# Patient Record
Sex: MALE | Race: WHITE | NOT HISPANIC OR LATINO | Employment: FULL TIME | URBAN - METROPOLITAN AREA
[De-identification: names, ages, dates, MRNs, and addresses within clinical notes are randomized per-mention and may not be internally consistent; named-entity substitution may affect disease eponyms.]

---

## 2019-03-12 ENCOUNTER — OFFICE VISIT (OUTPATIENT)
Dept: FAMILY MEDICINE CLINIC | Facility: CLINIC | Age: 19
End: 2019-03-12
Payer: COMMERCIAL

## 2019-03-12 VITALS
TEMPERATURE: 99.1 F | BODY MASS INDEX: 37.62 KG/M2 | HEIGHT: 68 IN | HEART RATE: 94 BPM | OXYGEN SATURATION: 98 % | DIASTOLIC BLOOD PRESSURE: 68 MMHG | SYSTOLIC BLOOD PRESSURE: 126 MMHG | WEIGHT: 248.2 LBS

## 2019-03-12 DIAGNOSIS — Z88.9 H/O SEASONAL ALLERGIES: ICD-10-CM

## 2019-03-12 DIAGNOSIS — J45.20 MILD INTERMITTENT REACTIVE AIRWAY DISEASE WITHOUT COMPLICATION: ICD-10-CM

## 2019-03-12 DIAGNOSIS — Z00.00 HEALTH CARE MAINTENANCE: Primary | ICD-10-CM

## 2019-03-12 PROCEDURE — 99385 PREV VISIT NEW AGE 18-39: CPT | Performed by: FAMILY MEDICINE

## 2019-03-12 RX ORDER — CETIRIZINE HYDROCHLORIDE 10 MG/1
10 TABLET ORAL DAILY
COMMUNITY

## 2019-03-12 RX ORDER — ALBUTEROL SULFATE 90 UG/1
2 AEROSOL, METERED RESPIRATORY (INHALATION) EVERY 6 HOURS PRN
Qty: 1 INHALER | Refills: 3 | Status: SHIPPED | OUTPATIENT
Start: 2019-03-12 | End: 2022-02-25 | Stop reason: SDUPTHER

## 2019-03-12 NOTE — PROGRESS NOTES
Assessment/Plan: new pt here today for yearly PE     No problem-specific Assessment & Plan notes found for this encounter  Diagnoses and all orders for this visit:    Health care maintenance    Mild intermittent reactive airway disease without complication  -     albuterol (PROAIR HFA) 90 mcg/act inhaler; Inhale 2 puffs every 6 (six) hours as needed for wheezing    H/O seasonal allergies    Other orders  -     cetirizine (ZyrTEC) 10 mg tablet; Take 10 mg by mouth daily  -     Multiple Vitamin (ONE-A-DAY MENS PO); Take by mouth  -     Discontinue: Albuterol Sulfate (PROAIR RESPICLICK) 509 (90 Base) MCG/ACT AEPB; Inhale          Subjective:      Patient ID: Jennifer Penaloza is a 19 y o  male  First visit  College student, 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, 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[employer])  PSH: Negative  PMH: Negative except for allergies, intermittent RAD  FH: Father with heart disease  Mother: HTN  CM: Zyrtec and Proair HFA if needed      The following portions of the patient's history were reviewed and updated as appropriate: allergies, current medications, past family history, past medical history, past social history, past surgical history and problem list     Current Outpatient Medications:     cetirizine (ZyrTEC) 10 mg tablet, Take 10 mg by mouth daily, Disp: , Rfl:     Multiple Vitamin (ONE-A-DAY MENS PO), Take by mouth, Disp: , Rfl:     albuterol (PROAIR HFA) 90 mcg/act inhaler, Inhale 2 puffs every 6 (six) hours as needed for wheezing, Disp: 1 Inhaler, Rfl: 3     Allergies   Allergen Reactions    Penicillins Rash       Review of Systems   Constitutional: Negative  HENT: Negative  Eyes: Negative  Respiratory: Negative  Cardiovascular: Negative  Gastrointestinal: Negative  Genitourinary: Negative  Musculoskeletal: Negative  Skin: Negative  Neurological: Negative  Psychiatric/Behavioral: Negative            Objective:      /68 (BP Location: Left arm, Patient Position: Sitting, Cuff Size: Large)   Pulse 94   Temp 99 1 °F (37 3 °C) (Oral)   Ht 5' 8 11" (1 73 m)   Wt 113 kg (248 lb 3 2 oz)   SpO2 98%   BMI 37 62 kg/m²          Physical Exam   Constitutional: He is oriented to person, place, and time  He appears well-developed and well-nourished  HENT:   Head: Normocephalic and atraumatic  Right Ear: External ear normal    Left Ear: External ear normal    Nose: Nose normal    Mouth/Throat: Oropharynx is clear and moist    Eyes: Pupils are equal, round, and reactive to light  Conjunctivae and EOM are normal    Neck: Normal range of motion  Neck supple  Cardiovascular: Normal rate, regular rhythm, normal heart sounds and intact distal pulses  Pulmonary/Chest: Effort normal and breath sounds normal    Abdominal: Soft  Bowel sounds are normal    Neurological: He is alert and oriented to person, place, and time  He has normal reflexes  Skin: Skin is warm and dry  Psychiatric: He has a normal mood and affect   His behavior is normal  Judgment and thought content normal

## 2020-03-10 ENCOUNTER — OFFICE VISIT (OUTPATIENT)
Dept: FAMILY MEDICINE CLINIC | Facility: CLINIC | Age: 20
End: 2020-03-10
Payer: COMMERCIAL

## 2020-03-10 VITALS
HEIGHT: 68 IN | OXYGEN SATURATION: 98 % | DIASTOLIC BLOOD PRESSURE: 75 MMHG | BODY MASS INDEX: 39.77 KG/M2 | HEART RATE: 97 BPM | TEMPERATURE: 98.7 F | WEIGHT: 262.4 LBS | SYSTOLIC BLOOD PRESSURE: 130 MMHG

## 2020-03-10 DIAGNOSIS — Z82.49 FAMILY HISTORY OF CARDIOMYOPATHY: ICD-10-CM

## 2020-03-10 DIAGNOSIS — E55.9 VITAMIN D DEFICIENCY: ICD-10-CM

## 2020-03-10 DIAGNOSIS — Z00.00 HEALTH CARE MAINTENANCE: Primary | ICD-10-CM

## 2020-03-10 DIAGNOSIS — R06.83 SNORING: ICD-10-CM

## 2020-03-10 DIAGNOSIS — E78.00 HYPERCHOLESTEREMIA: ICD-10-CM

## 2020-03-10 PROCEDURE — 3008F BODY MASS INDEX DOCD: CPT | Performed by: FAMILY MEDICINE

## 2020-03-10 PROCEDURE — 93000 ELECTROCARDIOGRAM COMPLETE: CPT | Performed by: FAMILY MEDICINE

## 2020-03-10 PROCEDURE — 99395 PREV VISIT EST AGE 18-39: CPT | Performed by: FAMILY MEDICINE

## 2020-03-10 NOTE — PROGRESS NOTES
Chief Complaint   Patient presents with    Physical Exam    Well Check     Assessment/Plan:  With fathers history, Lipids, EKG and ECHO suggested  See ENT this summer for snoring  BMI Counseling: Body mass index is 39 77 kg/m²  The BMI is above normal  Nutrition recommendations include moderation in carbohydrate intake and increasing intake of lean protein  Diagnoses and all orders for this visit:    Health care maintenance    Family history of cardiomyopathy  -     POCT ECG  -     Echo complete with contrast if indicated; Future    Hypercholesteremia  -     Basic metabolic panel; Future  -     CBC and Platelet; Future  -     Hepatic function panel; Future  -     Lipid panel; Future    Vitamin D deficiency  -     Vitamin D 25 hydroxy; Future    BMI 39 0-39 9,adult    Snoring          Subjective:      Patient ID: Belen Haro is a 22 y o  male  [facility]  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, computer crimes  Home on spring break  FH: Father diagnosed with idiopathic cardiomyopathy at age 45-Gene testing negative- and hyperlipidemia  Snoring, no fatigue  The following portions of the patient's history were reviewed and updated as appropriate: allergies, current medications, past family history, past medical history, past social history, past surgical history and problem list     Review of Systems   Constitutional: Negative  HENT: Negative  Eyes: Negative  Respiratory: Negative  Cardiovascular: Negative  Gastrointestinal: Negative  Genitourinary: Negative  Musculoskeletal: Negative  Skin: Negative  Neurological: Negative  Psychiatric/Behavioral: Negative  Objective:      /88 (BP Location: Left arm, Patient Position: Sitting, Cuff Size: Large)   Pulse 97   Temp 98 7 °F (37 1 °C) (Oral)   Ht 5' 8 11" (1 73 m)   Wt 119 kg (262 lb 6 4 oz)   SpO2 98%   BMI 39 77 kg/m²          Physical Exam   Constitutional: He is oriented to person, place, and time   He appears well-developed and well-nourished  HENT:   Head: Normocephalic and atraumatic  Right Ear: External ear normal    Left Ear: External ear normal    Nose: Nose normal    Mouth/Throat: Oropharynx is clear and moist    Eyes: Pupils are equal, round, and reactive to light  Conjunctivae and EOM are normal    Neck: Normal range of motion  Neck supple  Cardiovascular: Normal rate, regular rhythm, normal heart sounds and intact distal pulses  Pulmonary/Chest: Effort normal and breath sounds normal    Abdominal: Soft  Bowel sounds are normal    Neurological: He is alert and oriented to person, place, and time  He has normal reflexes  Skin: Skin is warm and dry  Psychiatric: He has a normal mood and affect   His behavior is normal  Judgment and thought content normal

## 2021-03-30 DIAGNOSIS — Z23 ENCOUNTER FOR IMMUNIZATION: ICD-10-CM

## 2021-04-01 ENCOUNTER — IMMUNIZATIONS (OUTPATIENT)
Dept: FAMILY MEDICINE CLINIC | Facility: HOSPITAL | Age: 21
End: 2021-04-01

## 2021-04-01 DIAGNOSIS — Z23 ENCOUNTER FOR IMMUNIZATION: Primary | ICD-10-CM

## 2021-04-01 PROCEDURE — 91300 SARS-COV-2 / COVID-19 MRNA VACCINE (PFIZER-BIONTECH) 30 MCG: CPT

## 2021-04-01 PROCEDURE — 0001A SARS-COV-2 / COVID-19 MRNA VACCINE (PFIZER-BIONTECH) 30 MCG: CPT

## 2021-04-22 ENCOUNTER — IMMUNIZATIONS (OUTPATIENT)
Dept: FAMILY MEDICINE CLINIC | Facility: HOSPITAL | Age: 21
End: 2021-04-22

## 2021-04-22 DIAGNOSIS — Z23 ENCOUNTER FOR IMMUNIZATION: Primary | ICD-10-CM

## 2021-04-22 PROCEDURE — 0002A SARS-COV-2 / COVID-19 MRNA VACCINE (PFIZER-BIONTECH) 30 MCG: CPT

## 2021-04-22 PROCEDURE — 91300 SARS-COV-2 / COVID-19 MRNA VACCINE (PFIZER-BIONTECH) 30 MCG: CPT

## 2022-02-24 ENCOUNTER — OFFICE VISIT (OUTPATIENT)
Dept: URGENT CARE | Age: 22
End: 2022-02-24
Payer: COMMERCIAL

## 2022-02-24 VITALS
HEIGHT: 68 IN | HEART RATE: 99 BPM | TEMPERATURE: 98.1 F | SYSTOLIC BLOOD PRESSURE: 137 MMHG | OXYGEN SATURATION: 100 % | DIASTOLIC BLOOD PRESSURE: 93 MMHG | WEIGHT: 250 LBS | BODY MASS INDEX: 37.89 KG/M2 | RESPIRATION RATE: 18 BRPM

## 2022-02-24 DIAGNOSIS — R10.84 GENERALIZED ABDOMINAL PAIN: Primary | ICD-10-CM

## 2022-02-24 PROCEDURE — S9083 URGENT CARE CENTER GLOBAL: HCPCS | Performed by: STUDENT IN AN ORGANIZED HEALTH CARE EDUCATION/TRAINING PROGRAM

## 2022-02-24 PROCEDURE — G0382 LEV 3 HOSP TYPE B ED VISIT: HCPCS | Performed by: STUDENT IN AN ORGANIZED HEALTH CARE EDUCATION/TRAINING PROGRAM

## 2022-02-24 NOTE — PROGRESS NOTES
[facility]        NAME: Shahana Jerez is a 19 y o  male  : 2000    MRN: [id number]  DATE: 2022  TIME: 9:51 AM    Assessment and Plan   Generalized abdominal pain [R10 84]  1  Generalized abdominal pain       May use 1% hydrocortisone cream over-the-counter, discussed increasing water intake as well as fiber intake, proceed to ER if worsening symptoms  Patient Instructions       Follow up with PCP in 3-5 days  Proceed to  ER if symptoms worsen  Chief Complaint     Chief Complaint   Patient presents with    Abdominal Pain    Rectal Bleeding         History of Present Illness       HPI   Patient presents complaining of generalized abdominal pain for the past few days  He is also described virtual with  Patient denies any change in bowel or bladder apart from this  Denies any fevers or chills  Patient states that he has been having large bowel movements for the past few days as well  Review of Systems   Review of Systems  Per hpi     Current Medications       Current Outpatient Medications:     albuterol (PROAIR HFA) 90 mcg/act inhaler, Inhale 2 puffs every 6 (six) hours as needed for wheezing, Disp: 1 Inhaler, Rfl: 3    cetirizine (ZyrTEC) 10 mg tablet, Take 10 mg by mouth daily, Disp: , Rfl:     Multiple Vitamin (ONE-A-DAY MENS PO), Take by mouth, Disp: , Rfl:     Current Allergies     Allergies as of 2022 - Reviewed 2022   Allergen Reaction Noted    Penicillins Rash 2019            The following portions of the patient's history were reviewed and updated as appropriate: allergies, current medications, past family history, past medical history, past social history, past surgical history and problem list      No past medical history on file  No past surgical history on file  Family History   Problem Relation Age of Onset    Cardiomyopathy Father          Medications have been verified          Objective   /93   Pulse 99   Temp 98 1 °F (36 7 °C)   Resp 18   Ht 5' 8" (1 727 m)   Wt 113 kg (250 lb)   SpO2 100%   BMI 38 01 kg/m²   No LMP for male patient  Physical Exam     Physical Exam  Constitutional:       General: He is not in acute distress  Appearance: He is well-developed  He is not diaphoretic  HENT:      Head: Normocephalic and atraumatic  Right Ear: Tympanic membrane and external ear normal       Left Ear: Tympanic membrane and external ear normal       Mouth/Throat:      Mouth: Mucous membranes are moist       Pharynx: Oropharynx is clear  No oropharyngeal exudate  Eyes:      Extraocular Movements: Extraocular movements intact  Conjunctiva/sclera: Conjunctivae normal    Cardiovascular:      Rate and Rhythm: Normal rate and regular rhythm  Heart sounds: Normal heart sounds  Pulmonary:      Effort: Pulmonary effort is normal  No respiratory distress  Breath sounds: Normal breath sounds  No wheezing  Abdominal:      General: Bowel sounds are normal  There is no distension  Palpations: Abdomen is soft  There is no mass  Tenderness: There is no abdominal tenderness  Genitourinary:     Comments: Anal fissure noted at the 9 o'clock position no active bleeding  Musculoskeletal:         General: No swelling or tenderness  Cervical back: Normal range of motion  Right lower leg: No edema  Left lower leg: No edema  Lymphadenopathy:      Cervical: No cervical adenopathy  Skin:     General: Skin is warm  Neurological:      Mental Status: He is alert and oriented to person, place, and time

## 2022-02-25 DIAGNOSIS — J45.20 MILD INTERMITTENT REACTIVE AIRWAY DISEASE WITHOUT COMPLICATION: ICD-10-CM

## 2022-02-25 RX ORDER — ALBUTEROL SULFATE 90 UG/1
2 AEROSOL, METERED RESPIRATORY (INHALATION) EVERY 6 HOURS PRN
Qty: 18 G | Refills: 0 | Status: SHIPPED | OUTPATIENT
Start: 2022-02-25

## 2022-02-25 RX ORDER — ALBUTEROL SULFATE 90 UG/1
2 AEROSOL, METERED RESPIRATORY (INHALATION) EVERY 6 HOURS PRN
OUTPATIENT
Start: 2022-02-25

## 2022-02-28 ENCOUNTER — OFFICE VISIT (OUTPATIENT)
Dept: FAMILY MEDICINE CLINIC | Facility: CLINIC | Age: 22
End: 2022-02-28
Payer: COMMERCIAL

## 2022-02-28 VITALS
WEIGHT: 243 LBS | HEART RATE: 112 BPM | HEIGHT: 68 IN | TEMPERATURE: 97.8 F | OXYGEN SATURATION: 98 % | DIASTOLIC BLOOD PRESSURE: 90 MMHG | SYSTOLIC BLOOD PRESSURE: 110 MMHG | BODY MASS INDEX: 36.83 KG/M2

## 2022-02-28 DIAGNOSIS — R10.13 EPIGASTRIC DISCOMFORT: ICD-10-CM

## 2022-02-28 DIAGNOSIS — Z82.49 FAMILY HISTORY OF DILATED CARDIOMYOPATHY: ICD-10-CM

## 2022-02-28 DIAGNOSIS — R06.2 WHEEZING: ICD-10-CM

## 2022-02-28 DIAGNOSIS — R14.2 BELCHING: ICD-10-CM

## 2022-02-28 DIAGNOSIS — R03.0 BORDERLINE HYPERTENSION: ICD-10-CM

## 2022-02-28 DIAGNOSIS — J45.20 MILD INTERMITTENT REACTIVE AIRWAY DISEASE WITHOUT COMPLICATION: ICD-10-CM

## 2022-02-28 DIAGNOSIS — J40 BRONCHITIS: Primary | ICD-10-CM

## 2022-02-28 PROCEDURE — 99214 OFFICE O/P EST MOD 30 MIN: CPT | Performed by: FAMILY MEDICINE

## 2022-02-28 RX ORDER — AZITHROMYCIN 250 MG/1
TABLET, FILM COATED ORAL
Qty: 6 TABLET | Refills: 0 | Status: SHIPPED | OUTPATIENT
Start: 2022-02-28 | End: 2022-03-05

## 2022-02-28 RX ORDER — METHYLPREDNISOLONE 4 MG/1
TABLET ORAL
Qty: 21 EACH | Refills: 0 | Status: SHIPPED | OUTPATIENT
Start: 2022-02-28

## 2022-02-28 RX ORDER — DOXYCYCLINE HYCLATE 100 MG
100 TABLET ORAL 2 TIMES DAILY
Qty: 14 TABLET | Refills: 0 | Status: SHIPPED | OUTPATIENT
Start: 2022-02-28 | End: 2022-02-28 | Stop reason: CLARIF

## 2022-02-28 RX ORDER — FAMOTIDINE 40 MG/1
40 TABLET, FILM COATED ORAL DAILY
Qty: 30 TABLET | Refills: 2 | Status: SHIPPED | OUTPATIENT
Start: 2022-02-28

## 2022-02-28 NOTE — PROGRESS NOTES
Chief Complaint   Patient presents with    Chest Pain     3 days    Abdominal Pain     Assessment/Plan:  Go for the ECHO with fathers history  No caffeine or choclate      BMI Counseling: Body mass index is 36 95 kg/m²  The BMI is above normal  Nutrition recommendations include reducing portion sizes, consuming healthier snacks, moderation in carbohydrate intake and increasing intake of lean protein  PHQ-2/9 Depression Screening    Little interest or pleasure in doing things: 0 - not at all  Feeling down, depressed, or hopeless: 0 - not at all  PHQ-2 Score: 0  PHQ-2 Interpretation: Negative depression screen          Diagnoses and all orders for this visit:    Bronchitis  -     Discontinue: doxycycline hyclate (VIBRA-TABS) 100 mg tablet; Take 1 tablet (100 mg total) by mouth 2 (two) times a day for 7 days  -     azithromycin (Zithromax) 250 mg tablet; Take 2 tablets (500 mg total) by mouth daily for 1 day, THEN 1 tablet (250 mg total) daily for 4 days  Wheezing  -     Discontinue: mometasone-formoterol (Dulera) 100-5 MCG/ACT inhaler; Inhale 2 puffs 2 (two) times a day Rinse mouth after use  -     methylPREDNISolone 4 MG tablet therapy pack; Use as directed on package    Mild intermittent reactive airway disease without complication    Family history of dilated cardiomyopathy  -     Echo complete w/ contrast if indicated; Future    Borderline hypertension  -     Echo complete w/ contrast if indicated; Future    Belching  -     famotidine (PEPCID) 40 MG tablet; Take 1 tablet (40 mg total) by mouth daily    Epigastric discomfort  -     famotidine (PEPCID) 40 MG tablet; Take 1 tablet (40 mg total) by mouth daily          Subjective:      Patient ID: Jennifer Penaloza is a 19 y o  male  Head and chest cold, coughing, little wheezing  Inhaler not working  Started 3 days ago  Hurt chest from coughing  SH: Working in lab cutting lenses    Epigastric discomfort past with belching and burping past 2 - 3 weeks       The following portions of the patient's history were reviewed and updated as appropriate: allergies, current medications, past family history, past medical history, past social history, past surgical history and problem list     Review of Systems   Constitutional: Negative  HENT: Negative  Eyes: Negative  Respiratory: Positive for cough, chest tightness and wheezing  Cardiovascular: Negative  Gastrointestinal: Negative  Genitourinary: Negative  Musculoskeletal: Negative  Skin: Negative  Neurological: Negative  Psychiatric/Behavioral: Negative  Objective:      /90 (BP Location: Left arm, Patient Position: Sitting, Cuff Size: Large)   Pulse (!) 112   Temp 97 8 °F (36 6 °C) (Temporal)   Ht 5' 8" (1 727 m)   Wt 110 kg (243 lb)   SpO2 98%   BMI 36 95 kg/m²     Current Outpatient Medications:     albuterol (ProAir HFA) 90 mcg/act inhaler, Inhale 2 puffs every 6 (six) hours as needed for wheezing, Disp: 18 g, Rfl: 0    azithromycin (Zithromax) 250 mg tablet, Take 2 tablets (500 mg total) by mouth daily for 1 day, THEN 1 tablet (250 mg total) daily for 4 days  , Disp: 6 tablet, Rfl: 0    cetirizine (ZyrTEC) 10 mg tablet, Take 10 mg by mouth daily (Patient not taking: Reported on 2/28/2022 ), Disp: , Rfl:     Dulera 100-5 MCG/ACT inhaler, INHALE 2 PUFFS BY MOUTH 2 TIMES A DAY RINSE MOUTH AFTER USE , Disp: 13 g, Rfl: 1    famotidine (PEPCID) 40 MG tablet, Take 1 tablet (40 mg total) by mouth daily, Disp: 30 tablet, Rfl: 2    methylPREDNISolone 4 MG tablet therapy pack, Use as directed on package, Disp: 21 each, Rfl: 0    Multiple Vitamin (ONE-A-DAY MENS PO), Take by mouth (Patient not taking: Reported on 2/28/2022 ), Disp: , Rfl:   Allergies   Allergen Reactions    Penicillins Rash     History reviewed  No pertinent surgical history  Physical Exam  Constitutional:       Appearance: He is well-developed     HENT:      Head: Normocephalic and atraumatic  Right Ear: External ear normal       Left Ear: External ear normal       Nose: Nose normal    Eyes:      Conjunctiva/sclera: Conjunctivae normal       Pupils: Pupils are equal, round, and reactive to light  Cardiovascular:      Rate and Rhythm: Normal rate and regular rhythm  Heart sounds: Normal heart sounds  Pulmonary:      Effort: Pulmonary effort is normal       Breath sounds: Normal breath sounds  Abdominal:      General: Abdomen is flat  Bowel sounds are normal       Palpations: Abdomen is soft  Comments: Mild epigastric discomfort to palpation  Musculoskeletal:      Cervical back: Normal range of motion and neck supple  Skin:     General: Skin is warm and dry  Neurological:      Mental Status: He is alert and oriented to person, place, and time  Deep Tendon Reflexes: Reflexes are normal and symmetric  Psychiatric:         Behavior: Behavior normal          Thought Content:  Thought content normal          Judgment: Judgment normal

## 2022-03-09 ENCOUNTER — TELEPHONE (OUTPATIENT)
Dept: FAMILY MEDICINE CLINIC | Facility: CLINIC | Age: 22
End: 2022-03-09

## 2022-03-09 NOTE — TELEPHONE ENCOUNTER
Pt called stated that he is having stomach aches, feeling very fatigue and having dizziness he will like to know if this is a side effect to medication

## 2022-03-09 NOTE — TELEPHONE ENCOUNTER
Called pt he stated that he is having left sided abdominal since Sunday and center chest pain with diarrhea and heart burn at times

## 2022-03-11 NOTE — TELEPHONE ENCOUNTER
Called pt LM asking how he is feeling and told to do BRAT diet, advised pt to call back if he is not feeling better for further guidance

## 2022-03-16 ENCOUNTER — RA CDI HCC (OUTPATIENT)
Dept: OTHER | Facility: HOSPITAL | Age: 22
End: 2022-03-16

## 2022-03-16 NOTE — PROGRESS NOTES
[facility]  coding opportunities       Chart reviewed, no opportunity found: CHART REVIEWED, NO OPPORTUNITY FOUND        Patients Insurance        Commercial Insurance: [employer]

## 2022-03-23 ENCOUNTER — OFFICE VISIT (OUTPATIENT)
Dept: FAMILY MEDICINE CLINIC | Facility: CLINIC | Age: 22
End: 2022-03-23
Payer: COMMERCIAL

## 2022-03-23 VITALS
WEIGHT: 237 LBS | BODY MASS INDEX: 35.92 KG/M2 | TEMPERATURE: 97.8 F | HEART RATE: 105 BPM | OXYGEN SATURATION: 98 % | DIASTOLIC BLOOD PRESSURE: 90 MMHG | SYSTOLIC BLOOD PRESSURE: 130 MMHG | HEIGHT: 68 IN

## 2022-03-23 DIAGNOSIS — K21.9 GASTROESOPHAGEAL REFLUX DISEASE, UNSPECIFIED WHETHER ESOPHAGITIS PRESENT: ICD-10-CM

## 2022-03-23 DIAGNOSIS — J45.20 MILD INTERMITTENT REACTIVE AIRWAY DISEASE WITHOUT COMPLICATION: ICD-10-CM

## 2022-03-23 DIAGNOSIS — Z00.00 HEALTH CARE MAINTENANCE: Primary | ICD-10-CM

## 2022-03-23 PROCEDURE — 3725F SCREEN DEPRESSION PERFORMED: CPT | Performed by: FAMILY MEDICINE

## 2022-03-23 PROCEDURE — 3008F BODY MASS INDEX DOCD: CPT | Performed by: FAMILY MEDICINE

## 2022-03-23 PROCEDURE — 99395 PREV VISIT EST AGE 18-39: CPT | Performed by: FAMILY MEDICINE

## 2022-03-23 PROCEDURE — 1036F TOBACCO NON-USER: CPT | Performed by: FAMILY MEDICINE

## 2022-03-23 RX ORDER — BUDESONIDE, GLYCOPYRROLATE, AND FORMOTEROL FUMARATE 160; 9; 4.8 UG/1; UG/1; UG/1
2 AEROSOL, METERED RESPIRATORY (INHALATION) 2 TIMES DAILY PRN
Qty: 10.7 G | Refills: 5 | Status: SHIPPED | OUTPATIENT
Start: 2022-03-23

## 2022-03-23 RX ORDER — PANTOPRAZOLE SODIUM 40 MG/1
40 TABLET, DELAYED RELEASE ORAL DAILY
Qty: 90 TABLET | Refills: 1 | Status: SHIPPED | OUTPATIENT
Start: 2022-03-23

## 2022-03-23 NOTE — PROGRESS NOTES
Chief Complaint   Patient presents with    Physical Exam     3 wk ck    Chest Pain     x 10 days     Assessment/Plan:  Will Rx Breztri - gave card for zero co-pay  Hold Pepcid, start Protonix 40 mg  Discussed reflux and RAD possible relationship  BMI Counseling: Body mass index is 36 04 kg/m²  The BMI is above normal  Nutrition recommendations include consuming healthier snacks, moderation in carbohydrate intake and increasing intake of lean protein  PHQ-2/9 Depression Screening    Little interest or pleasure in doing things: 0 - not at all  Feeling down, depressed, or hopeless: 0 - not at all  PHQ-2 Score: 0  PHQ-2 Interpretation: Negative depression screen          Diagnoses and all orders for this visit:    Health care maintenance    Mild intermittent reactive airway disease without complication  -     Budeson-Glycopyrrol-Formoterol (Breztri Aerosphere) 160-9-4 8 MCG/ACT AERO; Inhale 2 puffs 2 (two) times a day as needed (as needed) Rinse mouth after use  Gastroesophageal reflux disease, unspecified whether esophagitis present  -     pantoprazole (PROTONIX) 40 mg tablet; Take 1 tablet (40 mg total) by mouth daily          Subjective:      Patient ID: Justo Armenta is a 19 y o  male  Scheduled for the ECHO end of this month  Can get chest pain left side - touching makes it hurt - gets sitting  Ins needs a prior authorization for [facility]  Using albuterol 5 days a week  Continues to gfet indigestion with the famotidine 40 mg  Likes to eat spices  The following portions of the patient's history were reviewed and updated as appropriate: allergies, current medications, past family history, past medical history, past social history, past surgical history and problem list     Review of Systems   Constitutional: Negative  HENT: Negative  Eyes: Negative  Respiratory: Negative  Cardiovascular: Negative  Gastrointestinal: Negative  Genitourinary: Negative  Musculoskeletal: Negative  Skin: Negative  Neurological: Negative  Psychiatric/Behavioral: Negative  Objective:      /90 (BP Location: Left arm, Patient Position: Sitting, Cuff Size: Large)   Pulse 105   Temp 97 8 °F (36 6 °C) (Temporal)   Ht 5' 8" (1 727 m)   Wt 108 kg (237 lb)   SpO2 98%   BMI 36 04 kg/m²     Current Outpatient Medications:     albuterol (ProAir HFA) 90 mcg/act inhaler, Inhale 2 puffs every 6 (six) hours as needed for wheezing, Disp: 18 g, Rfl: 0    famotidine (PEPCID) 40 MG tablet, Take 1 tablet (40 mg total) by mouth daily, Disp: 30 tablet, Rfl: 2    cetirizine (ZyrTEC) 10 mg tablet, Take 10 mg by mouth daily (Patient not taking: Reported on 2/28/2022 ), Disp: , Rfl:     Dulera 100-5 MCG/ACT inhaler, INHALE 2 PUFFS BY MOUTH 2 TIMES A DAY RINSE MOUTH AFTER USE  (Patient not taking: Reported on 3/23/2022), Disp: 13 g, Rfl: 1    methylPREDNISolone 4 MG tablet therapy pack, Use as directed on package (Patient not taking: Reported on 3/23/2022 ), Disp: 21 each, Rfl: 0    Multiple Vitamin (ONE-A-DAY MENS PO), Take by mouth (Patient not taking: Reported on 2/28/2022 ), Disp: , Rfl:   Allergies   Allergen Reactions    Penicillins Rash     History reviewed  No pertinent surgical history  Physical Exam  Constitutional:       Appearance: Normal appearance  He is well-developed  HENT:      Head: Normocephalic and atraumatic  Right Ear: Tympanic membrane, ear canal and external ear normal       Left Ear: Tympanic membrane, ear canal and external ear normal       Nose: Nose normal       Mouth/Throat:      Mouth: Mucous membranes are moist       Pharynx: Oropharynx is clear  Eyes:      Conjunctiva/sclera: Conjunctivae normal       Pupils: Pupils are equal, round, and reactive to light  Cardiovascular:      Rate and Rhythm: Normal rate and regular rhythm  Pulses: Normal pulses  Heart sounds: Normal heart sounds     Pulmonary:      Effort: Pulmonary effort is normal       Breath sounds: Normal breath sounds  Abdominal:      General: Abdomen is flat  Bowel sounds are normal       Palpations: Abdomen is soft  Musculoskeletal:      Cervical back: Normal range of motion and neck supple  Skin:     General: Skin is warm and dry  Neurological:      General: No focal deficit present  Mental Status: He is alert and oriented to person, place, and time  Deep Tendon Reflexes: Reflexes are normal and symmetric  Psychiatric:         Mood and Affect: Mood normal          Behavior: Behavior normal          Thought Content:  Thought content normal          Judgment: Judgment normal

## 2022-03-31 ENCOUNTER — HOSPITAL ENCOUNTER (OUTPATIENT)
Dept: NON INVASIVE DIAGNOSTICS | Facility: CLINIC | Age: 22
Discharge: HOME | End: 2022-03-31
Payer: COMMERCIAL

## 2022-03-31 VITALS
HEART RATE: 112 BPM | DIASTOLIC BLOOD PRESSURE: 90 MMHG | BODY MASS INDEX: 35.92 KG/M2 | WEIGHT: 237 LBS | HEIGHT: 68 IN | SYSTOLIC BLOOD PRESSURE: 130 MMHG

## 2022-03-31 DIAGNOSIS — R03.0 BORDERLINE HYPERTENSION: ICD-10-CM

## 2022-03-31 DIAGNOSIS — Z82.49 FAMILY HISTORY OF DILATED CARDIOMYOPATHY: ICD-10-CM

## 2022-03-31 LAB
AORTIC ROOT: 3.1 CM
APICAL FOUR CHAMBER EJECTION FRACTION: 55 %
ASCENDING AORTA: 2.8 CM (ref 2.18–3.26)
AV REGURGITATION PRESSURE HALF TIME: 420 MS
E WAVE DECELERATION TIME: 114 MS
FRACTIONAL SHORTENING: 34 % (ref 28–44)
INTERVENTRICULAR SEPTUM IN DIASTOLE (PARASTERNAL SHORT AXIS VIEW): 1 CM
INTERVENTRICULAR SEPTUM: 1 CM (ref 0.56–1.06)
LAAS-AP2: 16 CM2
LAAS-AP4: 16.9 CM2
LEFT ATRIUM SIZE: 3.5 CM
LEFT INTERNAL DIMENSION IN SYSTOLE: 3.3 CM (ref 4.45–6.74)
LEFT VENTRICLE DIASTOLIC VOLUME (MOD BIPLANE): 132 ML (ref 113.26–255.08)
LEFT VENTRICLE SYSTOLIC VOLUME (MOD BIPLANE): 60 ML
LEFT VENTRICULAR INTERNAL DIMENSION IN DIASTOLE: 5 CM (ref 7.44–11.09)
LEFT VENTRICULAR POSTERIOR WALL IN END DIASTOLE: 1 CM (ref 0.55–1.04)
LEFT VENTRICULAR STROKE VOLUME: 71 ML
LV EF: 55 %
LVSV (TEICH): 71 ML
MV E'TISSUE VEL-SEP: 8 CM/S
MV PEAK E VEL: 87 CM/S
MV STENOSIS PRESSURE HALF TIME: 33 MS
MV VALVE AREA P 1/2 METHOD: 6.67 CM2
RIGHT ATRIUM AREA SYSTOLE A4C: 14.5 CM2
RIGHT VENTRICLE ID DIMENSION: 4 CM
SL CV AV DECELERATION TIME RETROGRADE: 1448 MS
SL CV AV PEAK GRADIENT RETROGRADE: 40 MMHG
SL CV LEFT ATRIUM LENGTH A2C: 5.1 CM
SL CV LV DIAS VOL ENDO Z SCORE: -1.47
SL CV LV EF: 60
SL CV PED ECHO LEFT VENTRICLE DIASTOLIC VOLUME (MOD BIPLANE) 2D: 116 ML
SL CV PED ECHO LEFT VENTRICLE SYSTOLIC VOLUME (MOD BIPLANE) 2D: 45 ML
TR MAX PG: 24 MMHG
TR PEAK VELOCITY: 2.4 M/S
TRICUSPID VALVE PEAK REGURGITATION VELOCITY: 2.43 M/S
Z-SCORE OF ASCENDING AORTA: 0.29
Z-SCORE OF INTERVENTRICULAR SEPTUM IN END DIASTOLE: 1.5
Z-SCORE OF LEFT VENTRICULAR DIMENSION IN END DIASTOLE: -5.97
Z-SCORE OF LEFT VENTRICULAR DIMENSION IN END SYSTOLE: -4
Z-SCORE OF LEFT VENTRICULAR POSTERIOR WALL IN END DIASTOLE: 1.62

## 2022-03-31 PROCEDURE — 93306 TTE W/DOPPLER COMPLETE: CPT

## 2022-03-31 PROCEDURE — 93306 TTE W/DOPPLER COMPLETE: CPT | Performed by: INTERNAL MEDICINE

## 2022-04-01 ENCOUNTER — TELEPHONE (OUTPATIENT)
Dept: FAMILY MEDICINE CLINIC | Facility: CLINIC | Age: 22
End: 2022-04-01

## 2022-04-01 NOTE — TELEPHONE ENCOUNTER
Called pt per Dr Finch gave pt Echo result spt verbalized understanding and will call back if needed

## 2022-05-20 ENCOUNTER — OFFICE VISIT (OUTPATIENT)
Dept: FAMILY MEDICINE CLINIC | Facility: CLINIC | Age: 22
End: 2022-05-20
Payer: COMMERCIAL

## 2022-05-20 VITALS
WEIGHT: 231.8 LBS | TEMPERATURE: 98 F | DIASTOLIC BLOOD PRESSURE: 92 MMHG | HEIGHT: 68 IN | SYSTOLIC BLOOD PRESSURE: 110 MMHG | BODY MASS INDEX: 35.13 KG/M2 | OXYGEN SATURATION: 98 % | HEART RATE: 91 BPM

## 2022-05-20 DIAGNOSIS — Z82.79 FAMILY HISTORY OF CONGENITAL HEART DISEASE IN FATHER: ICD-10-CM

## 2022-05-20 DIAGNOSIS — R53.83 TIREDNESS: ICD-10-CM

## 2022-05-20 DIAGNOSIS — F41.8 ANXIETY ABOUT HEALTH: ICD-10-CM

## 2022-05-20 DIAGNOSIS — E83.42 HYPOMAGNESEMIA: ICD-10-CM

## 2022-05-20 DIAGNOSIS — E55.9 VITAMIN D DEFICIENCY: ICD-10-CM

## 2022-05-20 DIAGNOSIS — R00.9 HEART RATE PROBLEM: Primary | ICD-10-CM

## 2022-05-20 DIAGNOSIS — R00.2 PALPITATIONS: ICD-10-CM

## 2022-05-20 DIAGNOSIS — R00.0 TACHYCARDIA: ICD-10-CM

## 2022-05-20 PROCEDURE — 3008F BODY MASS INDEX DOCD: CPT | Performed by: FAMILY MEDICINE

## 2022-05-20 PROCEDURE — 93000 ELECTROCARDIOGRAM COMPLETE: CPT | Performed by: FAMILY MEDICINE

## 2022-05-20 PROCEDURE — 1036F TOBACCO NON-USER: CPT | Performed by: FAMILY MEDICINE

## 2022-05-20 PROCEDURE — 99215 OFFICE O/P EST HI 40 MIN: CPT | Performed by: FAMILY MEDICINE

## 2022-05-20 PROCEDURE — 3725F SCREEN DEPRESSION PERFORMED: CPT | Performed by: FAMILY MEDICINE

## 2022-05-20 NOTE — PROGRESS NOTES
Assessment/Plan:  Studies ordered  Chief Complaint   Patient presents with    Follow-up     Elevated heart rate      PHQ-2/9 Depression Screening    Little interest or pleasure in doing things: 0 - not at all  Feeling down, depressed, or hopeless: 0 - not at all  PHQ-2 Score: 0  PHQ-2 Interpretation: Negative depression screen     BMI Counseling: Body mass index is 35 25 kg/m²  The BMI is above normal  Nutrition recommendations include reducing portion sizes  Diagnoses and all orders for this visit:    Heart rate problem  -     POCT ECG    Tachycardia  -     Holter monitor; Future  -     Echo stress test, exercise; Future  -     Basic metabolic panel; Future    Palpitations  -     Holter monitor; Future  -     Echo stress test, exercise; Future  -     Basic metabolic panel; Future  -     CBC and Platelet; Future  -     Hepatic function panel; Future    Tiredness  -     TSH, 3rd generation; Future  -     T4, free; Future  -     T3; Future  -     CBC and Platelet; Future  -     Hepatic function panel; Future    Family history of congenital heart disease in father  -     Echo stress test, exercise; Future    Hypomagnesemia  -     Magnesium; Future    Vitamin D deficiency  -     Vitamin D 25 hydroxy; Future    Anxiety about health          Subjective:      Patient ID: Anabella Pizano is a 23 y o  male  HR according to wrist recording watch recorded 212 BPM while having sex  Was supine, felt palpations, no lightheaded or off feeling  Admits to feeling a little tired lately but goes to bed midnight and up at 7 am   FH: Father with congenital heart disease  SH: Neg tob or [place]  PMH: No changes        The following portions of the patient's history were reviewed and updated as appropriate: allergies, current medications, past family history, past medical history, past social history, past surgical history and problem list   I have spent 40 minutes with Patient  today in which greater than 50% of this time was spent in counseling/coordination of care regarding Risks and benefits of tx options, Intructions for management, Patient and family education, Importance of tx compliance, Risk factor reductions and Impressions  Review of Systems   Constitutional: Negative  HENT: Negative  Eyes: Negative  Respiratory: Negative  Negative for shortness of breath  Cardiovascular: Positive for palpitations  Gastrointestinal: Negative  Genitourinary: Negative  Musculoskeletal: Negative  Skin: Negative  Neurological: Negative  Psychiatric/Behavioral: Negative  Objective:      /92 (BP Location: Left arm, Patient Position: Sitting, Cuff Size: Large)   Pulse 91   Temp 98 °F (36 7 °C) (Tympanic)   Ht 5' 8" (1 727 m)   Wt 105 kg (231 lb 12 8 oz)   SpO2 98%   BMI 35 25 kg/m²     Allergies   Allergen Reactions    Penicillins Rash   No past surgical history on file  Current Outpatient Medications:     albuterol (ProAir HFA) 90 mcg/act inhaler, Inhale 2 puffs every 6 (six) hours as needed for wheezing, Disp: 18 g, Rfl: 0    Budeson-Glycopyrrol-Formoterol (Breztri Aerosphere) 160-9-4 8 MCG/ACT AERO, Inhale 2 puffs 2 (two) times a day as needed (as needed) Rinse mouth after use , Disp: 10 7 g, Rfl: 5    cetirizine (ZyrTEC) 10 mg tablet, Take 10 mg by mouth in the morning , Disp: , Rfl:     pantoprazole (PROTONIX) 40 mg tablet, Take 1 tablet (40 mg total) by mouth daily, Disp: 90 tablet, Rfl: 1    famotidine (PEPCID) 40 MG tablet, Take 1 tablet (40 mg total) by mouth daily, Disp: 30 tablet, Rfl: 2    methylPREDNISolone 4 MG tablet therapy pack, Use as directed on package (Patient not taking: No sig reported), Disp: 21 each, Rfl: 0    Multiple Vitamin (ONE-A-DAY MENS PO), Take by mouth (Patient not taking: No sig reported), Disp: , Rfl:        Physical Exam  Constitutional:       Appearance: He is well-developed  Comments: Anxious appearing     HENT:      Head: Normocephalic and atraumatic  Right Ear: External ear normal       Left Ear: External ear normal       Nose: Nose normal    Eyes:      Conjunctiva/sclera: Conjunctivae normal       Pupils: Pupils are equal, round, and reactive to light  Cardiovascular:      Rate and Rhythm: Normal rate and regular rhythm  Pulses: Normal pulses  Heart sounds: Normal heart sounds  Pulmonary:      Effort: Pulmonary effort is normal       Breath sounds: Normal breath sounds  Abdominal:      General: Bowel sounds are normal       Palpations: Abdomen is soft  Musculoskeletal:      Cervical back: Normal range of motion and neck supple  Skin:     General: Skin is warm and dry  Neurological:      Mental Status: He is alert and oriented to person, place, and time  Deep Tendon Reflexes: Reflexes are normal and symmetric  Psychiatric:         Behavior: Behavior normal          Thought Content:  Thought content normal          Judgment: Judgment normal

## 2022-07-26 ENCOUNTER — RA CDI HCC (OUTPATIENT)
Dept: OTHER | Facility: HOSPITAL | Age: 22
End: 2022-07-26

## 2022-07-26 NOTE — PROGRESS NOTES
[facility]  coding opportunities       Chart reviewed, no opportunity found: CHART REVIEWED, NO OPPORTUNITY FOUND      Patients Insurance     Commercial Insurance: [address] [place] [address]

## 2022-08-18 ENCOUNTER — APPOINTMENT (OUTPATIENT)
Dept: NON INVASIVE DIAGNOSTICS | Facility: HOSPITAL | Age: 22
End: 2022-08-18
Payer: COMMERCIAL

## 2022-08-18 ENCOUNTER — HOSPITAL ENCOUNTER (OUTPATIENT)
Dept: NON INVASIVE DIAGNOSTICS | Facility: HOSPITAL | Age: 22
Discharge: HOME | End: 2022-08-18
Payer: COMMERCIAL

## 2022-08-18 DIAGNOSIS — R00.2 PALPITATIONS: ICD-10-CM

## 2022-08-18 DIAGNOSIS — R00.0 TACHYCARDIA: ICD-10-CM

## 2022-08-18 PROCEDURE — 93225 XTRNL ECG REC<48 HRS REC: CPT

## 2022-08-18 PROCEDURE — 93226 XTRNL ECG REC<48 HR SCAN A/R: CPT

## 2022-08-24 PROCEDURE — 93227 XTRNL ECG REC<48 HR R&I: CPT | Performed by: STUDENT IN AN ORGANIZED HEALTH CARE EDUCATION/TRAINING PROGRAM

## 2023-05-24 ENCOUNTER — OFFICE VISIT (OUTPATIENT)
Dept: FAMILY MEDICINE CLINIC | Facility: CLINIC | Age: 23
End: 2023-05-24

## 2023-05-24 VITALS
DIASTOLIC BLOOD PRESSURE: 78 MMHG | TEMPERATURE: 97.3 F | OXYGEN SATURATION: 99 % | BODY MASS INDEX: 35.13 KG/M2 | WEIGHT: 237.2 LBS | SYSTOLIC BLOOD PRESSURE: 126 MMHG | HEART RATE: 101 BPM | HEIGHT: 69 IN

## 2023-05-24 DIAGNOSIS — J40 BRONCHITIS: ICD-10-CM

## 2023-05-24 DIAGNOSIS — J45.20 MILD INTERMITTENT REACTIVE AIRWAY DISEASE WITHOUT COMPLICATION: Primary | ICD-10-CM

## 2023-05-24 RX ORDER — AZITHROMYCIN 250 MG/1
TABLET, FILM COATED ORAL
Qty: 6 TABLET | Refills: 0 | Status: SHIPPED | OUTPATIENT
Start: 2023-05-24 | End: 2023-05-29

## 2023-05-24 RX ORDER — METHYLPREDNISOLONE 4 MG/1
TABLET ORAL
Qty: 21 EACH | Refills: 1 | Status: SHIPPED | OUTPATIENT
Start: 2023-05-24

## 2023-05-24 NOTE — PROGRESS NOTES
Name: Scott Gonzalez      : 2000      MRN: [id number]  Encounter Provider: Destin Simons DO  Encounter Date: 2023   Encounter department: [address]      Chief Complaint   Patient presents with   • Cough     Productive and dry at times    • Sinusitis   • Shortness of Breath   BMI Counseling: Body mass index is 35 03 kg/m²  The BMI is above normal  Nutrition recommendations include reducing portion sizes and consuming healthier snacks  PHQ-2/9 Depression Screening    Little interest or pleasure in doing things: 0 - not at all  Feeling down, depressed, or hopeless: 0 - not at all  PHQ-2 Score: 0  PHQ-2 Interpretation: Negative depression screen           1  Mild intermittent reactive airway disease without complication  -     methylPREDNISolone 4 MG tablet therapy pack; Use as directed on package    2  Bronchitis  -     azithromycin (Zithromax) 250 mg tablet; Take 2 tablets (500 mg total) by mouth daily for 1 day, THEN 1 tablet (250 mg total) daily for 4 days  Subjective     Started  with coughing and wheezing while doing yard work  Using the albuterol  Cough  This is a new problem  The current episode started in the past 7 days  The problem has been waxing and waning  The problem occurs hourly  The cough is productive of sputum  Associated symptoms include headaches, myalgias, nasal congestion, postnasal drip, rhinorrhea, a sore throat, shortness of breath and wheezing  Pertinent negatives include no chest pain, chills, ear congestion, ear pain, fever, heartburn, hemoptysis, rash, sweats or weight loss  The symptoms are aggravated by exercise  Review of Systems   Constitutional: Negative for chills, fever and weight loss  HENT: Positive for postnasal drip, rhinorrhea and sore throat  Negative for ear pain  Respiratory: Positive for cough, shortness of breath and wheezing  Negative for hemoptysis  Cardiovascular: Negative for chest pain  Gastrointestinal: Negative for heartburn  Musculoskeletal: Positive for myalgias  Skin: Negative for rash  Neurological: Positive for headaches  No past medical history on file  No past surgical history on file  Family History   Problem Relation Age of Onset   • Cardiomyopathy Father      Social History     Socioeconomic History   • Marital status: Single     Spouse name: None   • Number of children: None   • Years of education: None   • Highest education level: None   Occupational History   • None   Tobacco Use   • Smoking status: Never   • Smokeless tobacco: Never   Vaping Use   • Vaping Use: Never used   Substance and Sexual Activity   • Alcohol use: None   • Drug use: None   • Sexual activity: None   Other Topics Concern   • None   Social History Narrative   • None     Social Determinants of Health     Financial Resource Strain: Not on file   Food Insecurity: Not on file   Transportation Needs: Not on file   Physical Activity: Not on file   Stress: Not on file   Social Connections: Not on file   Intimate Partner Violence: Not on file   Housing Stability: Not on file     Current Outpatient Medications on File Prior to Visit   Medication Sig   • albuterol (ProAir HFA) 90 mcg/act inhaler Inhale 2 puffs every 6 (six) hours as needed for wheezing   • Budeson-Glycopyrrol-Formoterol (Breztri Aerosphere) 160-9-4 8 MCG/ACT AERO Inhale 2 puffs 2 (two) times a day as needed (as needed) Rinse mouth after use  • cetirizine (ZyrTEC) 10 mg tablet Take 10 mg by mouth in the morning     • Multiple Vitamin (ONE-A-DAY MENS PO) Take by mouth   • famotidine (PEPCID) 40 MG tablet Take 1 tablet (40 mg total) by mouth daily (Patient not taking: Reported on 5/24/2023)   • pantoprazole (PROTONIX) 40 mg tablet Take 1 tablet (40 mg total) by mouth daily (Patient not taking: Reported on 5/24/2023)   • [DISCONTINUED] methylPREDNISolone 4 MG tablet therapy pack Use as directed on "package (Patient not taking: Reported on 3/23/2022)     Allergies   Allergen Reactions   • Penicillins Rash     Immunization History   Administered Date(s) Administered   • COVID-19 PFIZER VACCINE 0 3 ML IM 04/01/2021, 04/22/2021       Objective     /78 (BP Location: Left arm, Patient Position: Sitting, Cuff Size: Large)   Pulse 101   Temp (!) 97 3 °F (36 3 °C) (Temporal)   Ht 5' 9\" (1 753 m)   Wt 108 kg (237 lb 3 2 oz)   SpO2 99%   BMI 35 03 kg/m²     Physical Exam  Constitutional:       Appearance: He is well-developed  HENT:      Head: Normocephalic and atraumatic  Right Ear: Tympanic membrane, ear canal and external ear normal       Left Ear: Tympanic membrane, ear canal and external ear normal       Nose: Nose normal       Mouth/Throat:      Mouth: Mucous membranes are moist       Pharynx: Oropharynx is clear  Eyes:      Conjunctiva/sclera: Conjunctivae normal       Pupils: Pupils are equal, round, and reactive to light  Cardiovascular:      Rate and Rhythm: Normal rate and regular rhythm  Pulses: Normal pulses  Heart sounds: Normal heart sounds  Pulmonary:      Effort: Pulmonary effort is normal       Breath sounds: Wheezing present  Comments: End expiratory wheeze ACW  Abdominal:      General: Abdomen is flat  Bowel sounds are normal       Palpations: Abdomen is soft  Musculoskeletal:      Cervical back: Normal range of motion and neck supple  Skin:     General: Skin is warm and dry  Neurological:      Mental Status: He is alert and oriented to person, place, and time  Deep Tendon Reflexes: Reflexes are normal and symmetric  Psychiatric:         Mood and Affect: Mood normal          Behavior: Behavior normal          Thought Content:  Thought content normal          Judgment: Judgment normal        Kelly Nine, DO  "

## 2023-06-07 ENCOUNTER — TELEPHONE (OUTPATIENT)
Dept: FAMILY MEDICINE CLINIC | Facility: CLINIC | Age: 23
End: 2023-06-07

## 2023-07-31 ENCOUNTER — HOSPITAL ENCOUNTER (INPATIENT)
Facility: HOSPITAL | Age: 23
LOS: 2 days | Discharge: HOME | DRG: 262 | End: 2023-08-03
Attending: EMERGENCY MEDICINE | Admitting: INTERNAL MEDICINE
Payer: COMMERCIAL

## 2023-07-31 DIAGNOSIS — R00.2 PALPITATIONS: Primary | ICD-10-CM

## 2023-07-31 DIAGNOSIS — R00.0 WIDE-COMPLEX TACHYCARDIA: ICD-10-CM

## 2023-07-31 LAB
BASOPHILS # BLD AUTO: 0.07 THOUSANDS/ÂΜL (ref 0–0.1)
BASOPHILS NFR BLD AUTO: 1 % (ref 0–1)
CARDIAC TROPONIN I PNL SERPL HS: 3 NG/L
EOSINOPHIL # BLD AUTO: 0.22 THOUSAND/ÂΜL (ref 0–0.61)
EOSINOPHIL NFR BLD AUTO: 2 % (ref 0–6)
ERYTHROCYTE [DISTWIDTH] IN BLOOD BY AUTOMATED COUNT: 11.8 % (ref 11.6–15.1)
HCT VFR BLD AUTO: 49.5 % (ref 36.5–49.3)
HGB BLD-MCNC: 16.9 G/DL (ref 12–17)
IMM GRANULOCYTES # BLD AUTO: 0.03 THOUSAND/UL (ref 0–0.2)
IMM GRANULOCYTES NFR BLD AUTO: 0 % (ref 0–2)
LYMPHOCYTES # BLD AUTO: 2.76 THOUSANDS/ÂΜL (ref 0.6–4.47)
LYMPHOCYTES NFR BLD AUTO: 25 % (ref 14–44)
MCH RBC QN AUTO: 28.9 PG (ref 26.8–34.3)
MCHC RBC AUTO-ENTMCNC: 34.1 G/DL (ref 31.4–37.4)
MCV RBC AUTO: 85 FL (ref 82–98)
MONOCYTES # BLD AUTO: 0.73 THOUSAND/ÂΜL (ref 0.17–1.22)
MONOCYTES NFR BLD AUTO: 7 % (ref 4–12)
NEUTROPHILS # BLD AUTO: 7.31 THOUSANDS/ÂΜL (ref 1.85–7.62)
NEUTS SEG NFR BLD AUTO: 65 % (ref 43–75)
NRBC BLD AUTO-RTO: 0 /100 WBCS
PLATELET # BLD AUTO: 340 THOUSANDS/UL (ref 149–390)
PMV BLD AUTO: 8.9 FL (ref 8.9–12.7)
RBC # BLD AUTO: 5.85 MILLION/UL (ref 3.88–5.62)
WBC # BLD AUTO: 11.12 THOUSAND/UL (ref 4.31–10.16)

## 2023-07-31 PROCEDURE — 93005 ELECTROCARDIOGRAM TRACING: CPT

## 2023-07-31 PROCEDURE — 85025 COMPLETE CBC W/AUTO DIFF WBC: CPT | Performed by: EMERGENCY MEDICINE

## 2023-07-31 PROCEDURE — 99285 EMERGENCY DEPT VISIT HI MDM: CPT | Performed by: EMERGENCY MEDICINE

## 2023-07-31 PROCEDURE — 84484 ASSAY OF TROPONIN QUANT: CPT | Performed by: EMERGENCY MEDICINE

## 2023-07-31 PROCEDURE — 36415 COLL VENOUS BLD VENIPUNCTURE: CPT

## 2023-07-31 PROCEDURE — 99285 EMERGENCY DEPT VISIT HI MDM: CPT

## 2023-07-31 PROCEDURE — 80053 COMPREHEN METABOLIC PANEL: CPT | Performed by: EMERGENCY MEDICINE

## 2023-07-31 NOTE — Clinical Note
The site was marked. Prepped: chest. Prepped with: ChloraPrep. The site was clipped. The patient was draped.

## 2023-08-01 ENCOUNTER — APPOINTMENT (OUTPATIENT)
Dept: RADIOLOGY | Facility: HOSPITAL | Age: 23
DRG: 262 | End: 2023-08-01
Payer: COMMERCIAL

## 2023-08-01 ENCOUNTER — APPOINTMENT (OUTPATIENT)
Dept: NON INVASIVE DIAGNOSTICS | Facility: HOSPITAL | Age: 23
DRG: 262 | End: 2023-08-01
Attending: INTERNAL MEDICINE
Payer: COMMERCIAL

## 2023-08-01 PROBLEM — R00.2 PALPITATIONS: Status: ACTIVE | Noted: 2023-08-01

## 2023-08-01 LAB
ALBUMIN SERPL BCP-MCNC: 3.8 G/DL (ref 3.5–5)
ALBUMIN SERPL BCP-MCNC: 4.2 G/DL (ref 3.5–5)
ALP SERPL-CCNC: 65 U/L (ref 46–116)
ALP SERPL-CCNC: 75 U/L (ref 46–116)
ALT SERPL W P-5'-P-CCNC: 50 U/L (ref 12–78)
ALT SERPL W P-5'-P-CCNC: 61 U/L (ref 12–78)
ANION GAP SERPL CALCULATED.3IONS-SCNC: 5 MMOL/L
ANION GAP SERPL CALCULATED.3IONS-SCNC: 5 MMOL/L
AORTIC ROOT: 3.6 CM
AORTIC VALVE MEAN VELOCITY: 7.9 M/S
APICAL FOUR CHAMBER EJECTION FRACTION: 54 %
APTT PPP: 32 SECONDS (ref 23–37)
ASCENDING AORTA: 3.1 CM
AST SERPL W P-5'-P-CCNC: 17 U/L (ref 5–45)
AST SERPL W P-5'-P-CCNC: 26 U/L (ref 5–45)
ATRIAL RATE: 112 BPM
AV AREA BY CONTINUOUS VTI: 3.6 CM2
AV AREA PEAK VELOCITY: 3.8 CM2
AV LVOT MEAN GRADIENT: 2 MMHG
AV LVOT PEAK GRADIENT: 4 MMHG
AV MEAN GRADIENT: 3 MMHG
AV PEAK GRADIENT: 5 MMHG
AV REGURGITATION PRESSURE HALF TIME: 329 MS
AV VALVE AREA: 3.62 CM2
AV VELOCITY RATIO: 0.91
BASOPHILS # BLD AUTO: 0.06 THOUSANDS/ÂΜL (ref 0–0.1)
BASOPHILS NFR BLD AUTO: 1 % (ref 0–1)
BILIRUB SERPL-MCNC: 0.37 MG/DL (ref 0.2–1)
BILIRUB SERPL-MCNC: 0.38 MG/DL (ref 0.2–1)
BUN SERPL-MCNC: 12 MG/DL (ref 5–25)
BUN SERPL-MCNC: 9 MG/DL (ref 5–25)
CALCIUM SERPL-MCNC: 9 MG/DL (ref 8.3–10.1)
CALCIUM SERPL-MCNC: 9.9 MG/DL (ref 8.3–10.1)
CHLORIDE SERPL-SCNC: 108 MMOL/L (ref 96–108)
CHLORIDE SERPL-SCNC: 111 MMOL/L (ref 96–108)
CO2 SERPL-SCNC: 24 MMOL/L (ref 21–32)
CO2 SERPL-SCNC: 27 MMOL/L (ref 21–32)
CREAT SERPL-MCNC: 0.75 MG/DL (ref 0.6–1.3)
CREAT SERPL-MCNC: 0.89 MG/DL (ref 0.6–1.3)
D DIMER PPP FEU-MCNC: <0.27 UG/ML FEU
DOP CALC AO PEAK VEL: 1.11 M/S
DOP CALC AO VTI: 22.63 CM
DOP CALC LVOT AREA: 4.15 CM2
DOP CALC LVOT CARDIAC INDEX: 2.72 L/MIN/M2
DOP CALC LVOT CARDIAC OUTPUT: 6.03 L/MIN
DOP CALC LVOT DIAMETER: 2.3 CM
DOP CALC LVOT PEAK VEL VTI: 19.72 CM
DOP CALC LVOT PEAK VEL: 1.01 M/S
DOP CALC LVOT STROKE INDEX: 35.1 ML/M2
DOP CALC LVOT STROKE VOLUME: 81.89 CM3
E WAVE DECELERATION TIME: 168 MS
EOSINOPHIL # BLD AUTO: 0.25 THOUSAND/ÂΜL (ref 0–0.61)
EOSINOPHIL NFR BLD AUTO: 3 % (ref 0–6)
ERYTHROCYTE [DISTWIDTH] IN BLOOD BY AUTOMATED COUNT: 11.9 % (ref 11.6–15.1)
FRACTIONAL SHORTENING: 34 % (ref 28–44)
GFR SERPL CREATININE-BSD FRML MDRD: 118 ML/MIN/1.73SQ M
GFR SERPL CREATININE-BSD FRML MDRD: 127 ML/MIN/1.73SQ M
GLUCOSE SERPL-MCNC: 104 MG/DL (ref 65–140)
GLUCOSE SERPL-MCNC: 90 MG/DL (ref 65–140)
HCT VFR BLD AUTO: 45.7 % (ref 36.5–49.3)
HGB BLD-MCNC: 16.1 G/DL (ref 12–17)
IMM GRANULOCYTES # BLD AUTO: 0.03 THOUSAND/UL (ref 0–0.2)
IMM GRANULOCYTES NFR BLD AUTO: 0 % (ref 0–2)
INR PPP: 1.12 (ref 0.84–1.19)
INTERVENTRICULAR SEPTUM IN DIASTOLE (PARASTERNAL SHORT AXIS VIEW): 1.1 CM
INTERVENTRICULAR SEPTUM: 1.1 CM (ref 0.6–1.1)
LAAS-AP2: 22.1 CM2
LAAS-AP4: 21.4 CM2
LEFT ATRIUM SIZE: 3.7 CM
LEFT ATRIUM VOLUME (MOD BIPLANE): 70 ML
LEFT INTERNAL DIMENSION IN SYSTOLE: 3.1 CM (ref 2.1–4)
LEFT VENTRICLE DIASTOLIC VOLUME (MOD BIPLANE): 157 ML
LEFT VENTRICLE SYSTOLIC VOLUME (MOD BIPLANE): 78 ML
LEFT VENTRICULAR INTERNAL DIMENSION IN DIASTOLE: 4.7 CM (ref 3.5–6)
LEFT VENTRICULAR POSTERIOR WALL IN END DIASTOLE: 1.1 CM
LEFT VENTRICULAR STROKE VOLUME: 65 ML
LV EF: 50 %
LVSV (TEICH): 65 ML
LYMPHOCYTES # BLD AUTO: 3.28 THOUSANDS/ÂΜL (ref 0.6–4.47)
LYMPHOCYTES NFR BLD AUTO: 33 % (ref 14–44)
MAGNESIUM SERPL-MCNC: 2.6 MG/DL (ref 1.6–2.6)
MCH RBC QN AUTO: 29.6 PG (ref 26.8–34.3)
MCHC RBC AUTO-ENTMCNC: 35.2 G/DL (ref 31.4–37.4)
MCV RBC AUTO: 84 FL (ref 82–98)
MONOCYTES # BLD AUTO: 0.75 THOUSAND/ÂΜL (ref 0.17–1.22)
MONOCYTES NFR BLD AUTO: 8 % (ref 4–12)
MV E'TISSUE VEL-LAT: 12 CM/S
MV E'TISSUE VEL-SEP: 9 CM/S
MV PEAK A VEL: 0.54 M/S
MV PEAK E VEL: 70 CM/S
MV STENOSIS PRESSURE HALF TIME: 49 MS
MV VALVE AREA P 1/2 METHOD: 4.49 CM2
NEUTROPHILS # BLD AUTO: 5.68 THOUSANDS/ÂΜL (ref 1.85–7.62)
NEUTS SEG NFR BLD AUTO: 55 % (ref 43–75)
NRBC BLD AUTO-RTO: 0 /100 WBCS
P AXIS: 66 DEGREES
PLATELET # BLD AUTO: 315 THOUSANDS/UL (ref 149–390)
PMV BLD AUTO: 9.3 FL (ref 8.9–12.7)
POTASSIUM SERPL-SCNC: 3.4 MMOL/L (ref 3.5–5.3)
POTASSIUM SERPL-SCNC: 3.7 MMOL/L (ref 3.5–5.3)
PR INTERVAL: 162 MS
PROT SERPL-MCNC: 7.7 G/DL (ref 6.4–8.4)
PROT SERPL-MCNC: 8.7 G/DL (ref 6.4–8.4)
PROTHROMBIN TIME: 14.6 SECONDS (ref 11.6–14.5)
QRS AXIS: 74 DEGREES
QRSD INTERVAL: 98 MS
QT INTERVAL: 332 MS
QTC INTERVAL: 453 MS
RA PRESSURE ESTIMATED: 8 MMHG
RBC # BLD AUTO: 5.44 MILLION/UL (ref 3.88–5.62)
RIGHT ATRIUM AREA SYSTOLE A4C: 18.5 CM2
RIGHT VENTRICLE ID DIMENSION: 3.2 CM
SL CV AV DECELERATION TIME RETROGRADE: 1135 MS
SL CV AV PEAK GRADIENT RETROGRADE: 53 MMHG
SL CV LEFT ATRIUM LENGTH A2C: 5.2 CM
SL CV LV EF: 55
SL CV PED ECHO LEFT VENTRICLE DIASTOLIC VOLUME (MOD BIPLANE) 2D: 104 ML
SL CV PED ECHO LEFT VENTRICLE SYSTOLIC VOLUME (MOD BIPLANE) 2D: 39 ML
SODIUM SERPL-SCNC: 140 MMOL/L (ref 135–147)
SODIUM SERPL-SCNC: 140 MMOL/L (ref 135–147)
T WAVE AXIS: 22 DEGREES
TRICUSPID ANNULAR PLANE SYSTOLIC EXCURSION: 2.7 CM
TSH SERPL DL<=0.05 MIU/L-ACNC: 1.7 UIU/ML (ref 0.45–4.5)
VENTRICULAR RATE: 112 BPM
WBC # BLD AUTO: 10.05 THOUSAND/UL (ref 4.31–10.16)

## 2023-08-01 PROCEDURE — 99223 1ST HOSP IP/OBS HIGH 75: CPT | Performed by: INTERNAL MEDICINE

## 2023-08-01 PROCEDURE — 96360 HYDRATION IV INFUSION INIT: CPT

## 2023-08-01 PROCEDURE — 96361 HYDRATE IV INFUSION ADD-ON: CPT

## 2023-08-01 PROCEDURE — 71045 X-RAY EXAM CHEST 1 VIEW: CPT

## 2023-08-01 PROCEDURE — 36415 COLL VENOUS BLD VENIPUNCTURE: CPT

## 2023-08-01 PROCEDURE — 84443 ASSAY THYROID STIM HORMONE: CPT

## 2023-08-01 PROCEDURE — 85025 COMPLETE CBC W/AUTO DIFF WBC: CPT | Performed by: INTERNAL MEDICINE

## 2023-08-01 PROCEDURE — 93306 TTE W/DOPPLER COMPLETE: CPT

## 2023-08-01 PROCEDURE — 83735 ASSAY OF MAGNESIUM: CPT | Performed by: INTERNAL MEDICINE

## 2023-08-01 PROCEDURE — 85610 PROTHROMBIN TIME: CPT | Performed by: INTERNAL MEDICINE

## 2023-08-01 PROCEDURE — 75561 CARDIAC MRI FOR MORPH W/DYE: CPT

## 2023-08-01 PROCEDURE — 99024 POSTOP FOLLOW-UP VISIT: CPT | Performed by: NURSE PRACTITIONER

## 2023-08-01 PROCEDURE — 80053 COMPREHEN METABOLIC PANEL: CPT | Performed by: INTERNAL MEDICINE

## 2023-08-01 PROCEDURE — 85379 FIBRIN DEGRADATION QUANT: CPT

## 2023-08-01 PROCEDURE — 85730 THROMBOPLASTIN TIME PARTIAL: CPT | Performed by: INTERNAL MEDICINE

## 2023-08-01 PROCEDURE — 93306 TTE W/DOPPLER COMPLETE: CPT | Performed by: INTERNAL MEDICINE

## 2023-08-01 RX ORDER — ACETAMINOPHEN 325 MG/1
650 TABLET ORAL EVERY 6 HOURS PRN
Status: DISCONTINUED | OUTPATIENT
Start: 2023-08-01 | End: 2023-08-03 | Stop reason: HOSPADM

## 2023-08-01 RX ORDER — LEVALBUTEROL INHALATION SOLUTION 1.25 MG/3ML
1.25 SOLUTION RESPIRATORY (INHALATION) EVERY 8 HOURS PRN
Status: DISCONTINUED | OUTPATIENT
Start: 2023-08-01 | End: 2023-08-03 | Stop reason: HOSPADM

## 2023-08-01 RX ORDER — LORATADINE 10 MG/1
10 TABLET ORAL DAILY
Status: DISCONTINUED | OUTPATIENT
Start: 2023-08-01 | End: 2023-08-01

## 2023-08-01 RX ORDER — POTASSIUM CHLORIDE 20 MEQ/1
40 TABLET, EXTENDED RELEASE ORAL ONCE
Status: COMPLETED | OUTPATIENT
Start: 2023-08-01 | End: 2023-08-01

## 2023-08-01 RX ORDER — MAGNESIUM SULFATE 1 G/100ML
1 INJECTION INTRAVENOUS ONCE
Status: COMPLETED | OUTPATIENT
Start: 2023-08-01 | End: 2023-08-01

## 2023-08-01 RX ADMIN — POTASSIUM CHLORIDE 40 MEQ: 1500 TABLET, EXTENDED RELEASE ORAL at 13:55

## 2023-08-01 RX ADMIN — SODIUM CHLORIDE 1000 ML: 0.9 INJECTION, SOLUTION INTRAVENOUS at 00:11

## 2023-08-01 RX ADMIN — MAGNESIUM SULFATE HEPTAHYDRATE 1 G: 1 INJECTION, SOLUTION INTRAVENOUS at 03:23

## 2023-08-01 NOTE — H&P
[facility]  H&P  Name: Marily Gonzalez 20 y.o. male I MRN: [id number]  Unit/Bed#: ED 34 I Date of Admission: 7/31/2023   Date of Service: 8/1/2023 I Hospital Day: 0      Assessment/Plan   * Palpitations  Assessment & Plan  · Episode this past Thursday in which patient coughed and had palpitations as well as dizziness and nausea. Another episode this evening in which patient was lying on his left side and felt similar symptoms. · Patient wears an Apple Watch and patient's Apple watch from episode this past Thursday appears to show episode of VT on the watch itself which Dr. Keisha Horne in the ER uploaded in his note from earlier today. · EKG in the ER currently sinus tachycardia, noted S1 Q3, T3, rate 112, no high-grade AV block, no stemi, no contiguous t wave inversions, QTc 453  · Troponin negative x1  · D-Dimer negative  · Denies any current symptoms  · Reports that he has had about 5 lifetime episodes of the similar symptoms which began he thinks in about 2021. Reports a distinctive episode in July 2022 of feeling dizzy and having palpitations [place] and resolved on its own. He then went to see cardiology outpatient who had patient wear a Holter monitor for a day in August 2022 in which no episodes of VT were identified but patient also reports he had no symptoms for this day he wore the Holter monitor. · Previously had an echo in March 2022 with normal LVEF  · Patient's father has a history of "idiopathic cardiomyopathy" in his early 45s and there is also significant cardiac history for his grandfather. Patient additionally has several siblings and one of his sisters has had similar symptoms  · History of asthma-denies any current shortness of breath or wheezing  · Admit to medicine on telemetry for monitoring of heart rhythm and for EP team evaluation of symptoms as well as the review of rhythm.   Order 2D echocardiogram.  Order chest x-ray.  · Follow-up on TSH            VTE Prophylaxis:  sequential compression device   Code Status: Level 1 - Full Code       Anticipated Length of Stay:  Patient will be admitted on an Observation basis with an anticipated length of stay of  < 2 midnights. Justification for Hospital Stay: Please see detailed plans noted above. Chief Complaint:     Palpitations  History of Present Illness:  Cade Connor is a 20 y.o. male who has past medical history significant for significant family history of early cardiac disease as well as asthma who presented to [address] [place] [address] ER on the evening of 7/31 with symptoms of palpitations as well as dizziness. Patient reports that this past Thursday he coughed and then after the cough he felt palpitations and that his heart felt like it was beating out of sync as well as nausea and dizziness. He reports that the symptoms improved on their own after short time but that it was a very uncomfortable feeling. He reports that the last time he had had symptoms similar to this was in July 2022 when [place] and had a very similar episode for which the symptoms then also improved on their own. He reports that after this episode in July 2022 he saw the cardiology team who had him wear a Holter monitor in August 2022 in which she reports that was unremarkable but he also reports that he had no episodes for the 24 hours in which he wore the Holter monitor. Patient reported no symptoms like this up until this past Thursday. Patient further reports he was wearing an Apple Watch during the palpitations this past Thursday and had a image of the exact time in which he had the symptoms (744 pm on 7/27 on the Apple Watch) and showed the image to me. Images appear to show an episode of ventricular tachycardia. These images have been uploaded and Dr. Billie Steen emergency room physician  Note.   Patient reports that he then had another similar episode but less intense earlier in the evening last night in which she was lying on his bed on his left side. Patient further reports that his father has a history of "idiopathic cardiomyopathy" diagnosed in his 45s as well as his maternal grandfather had early cardiac disease. Patient additionally has a sister who herself has had episodes of palpitations. Patient currently denies any chest pain or shortness of breath or nausea or vomiting. Review of Systems:    Constitutional:  Denies fever or chills   Eyes:  Denies change in visual acuity   HENT:  Denies nasal congestion or sore throat   Respiratory:  Denies cough or shortness of breath   Cardiovascular:  Denies chest pain or edema   GI:  Denies abdominal pain or bloody stools  :  Denies dysuria   Musculoskeletal:  Denies back pain or joint pain   Integument:  Denies rash   Neurologic:  Denies headache or sensory changes   Endocrine:  Denies polyuria or polydipsia   Lymphatic:  Denies swollen glands   Psychiatric:  Denies depression or anxiety     Past Medical and Surgical History:   History reviewed. No pertinent past medical history. History reviewed. No pertinent surgical history. Meds/Allergies:  (Not in a hospital admission)      Allergies:    Allergies   Allergen Reactions   • Penicillins Rash       History:  Marital Status: Single     Substance Use History:   Social History     Substance and Sexual Activity   Alcohol Use None     Social History     Tobacco Use   Smoking Status Never   Smokeless Tobacco Never     Social History     Substance and Sexual Activity   Drug Use Not on file       Family History:  Family History   Problem Relation Age of Onset   • Cardiomyopathy Father        Physical Exam:     Vitals:   Blood Pressure: 139/96 (08/01/23 0015)  Pulse: (!) 106 (08/01/23 0015)  Temperature: 99.1 °F (37.3 °C) (07/31/23 2150)  Temp Source: Oral (07/31/23 2150)  Respirations: 21 (08/01/23 0015)  SpO2: 97 % (08/01/23 0015)    Constitutional: Alert and oriented  Eyes: EOMI, No scleral icterus   HENT:   oropharynx moist, external ears normal, external nose normal   Respiratory:  No respiratory distress, no wheezing   Cardiovascular:  Normal rate, no murmurs   GI:  Soft, nondistended, no guarding   :  No costovertebral angle tenderness   Musculoskeletal:  no tenderness, no deformities. Back- no tenderness  Integument:  no jaundice, no rash   Neurologic:  Alert &awake, communicative, CN 2-12 normal,  no focal deficits noted   Psychiatric:  Speech and behavior appropriate       Lab Results: I have personally reviewed pertinent reports. Results from last 7 days   Lab Units 07/31/23  2303   WBC Thousand/uL 11.12*   HEMOGLOBIN g/dL 16.9   HEMATOCRIT % 49.5*   PLATELETS Thousands/uL 340   NEUTROS PCT % 65   LYMPHS PCT % 25   MONOS PCT % 7   EOS PCT % 2     Results from last 7 days   Lab Units 07/31/23  2303   POTASSIUM mmol/L 3.7   CHLORIDE mmol/L 108   CO2 mmol/L 27   BUN mg/dL 12   CREATININE mg/dL 0.89   CALCIUM mg/dL 9.9   ALK PHOS U/L 75   ALT U/L 61   AST U/L 26             Imaging: I have personally reviewed pertinent reports. No results found. Total time for visit, including counseling/coordination of care: 45 minutes. Greater than 50% of this total time spent on direct patient counseling and coorination of care. Epic Records Reviewed as well as Records in Care Everywhere    ** Please Note: Dragon 360 Dictation voice to text software was used in the creation of this document.  **

## 2023-08-01 NOTE — ED PROVIDER NOTES
History  Chief Complaint   Patient presents with   • Rapid Heart Rate     Hx of tachycardia. Pt c/o fluttering in chest; denies chest pain. Pt states SOB and chest tightness. Per pt, HR has been 120-140 all day     Patient is a 76-year-old male with no significant past medical history presenting for evaluation of palpitations. He says that this past Thursday he coughed and started experiencing some irregular feeling palpitations that lasted for approximately 10 minutes. At that time he was having some lightheadedness as well as diaphoresis. Afterwards, he feels as if the intensity of symptoms and irregularity to the heartbeat has since subsided, however he is still feeling a fast heartbeat. Throughout this, he has been having some shortness of breath without any pleuritic symptoms or exertional features. He denies any chest pain. He denies any history of cardiac issues. He denies family history of sudden cardiac death. He denies drug or alcohol use. He does have apple watch tracings during the events that seem to show ventricular tachycardia. Prior to Admission Medications   Prescriptions Last Dose Informant Patient Reported? Taking? Budeson-Glycopyrrol-Formoterol (Breztri Aerosphere) 160-9-4.8 MCG/ACT AERO  Self No No   Sig: Inhale 2 puffs 2 (two) times a day as needed (as needed) Rinse mouth after use. Multiple Vitamin (ONE-A-DAY MENS PO)  Self Yes No   Sig: Take by mouth   albuterol (ProAir HFA) 90 mcg/act inhaler  Self No No   Sig: Inhale 2 puffs every 6 (six) hours as needed for wheezing   cetirizine (ZyrTEC) 10 mg tablet  Self Yes No   Sig: Take 10 mg by mouth in the morning.    famotidine (PEPCID) 40 MG tablet  Self No No   Sig: Take 1 tablet (40 mg total) by mouth daily   Patient not taking: Reported on 5/24/2023   methylPREDNISolone 4 MG tablet therapy pack   No No   Sig: Use as directed on package   pantoprazole (PROTONIX) 40 mg tablet  Self No No   Sig: Take 1 tablet (40 mg total) by mouth daily   Patient not taking: Reported on 5/24/2023      Facility-Administered Medications: None       History reviewed. No pertinent past medical history. History reviewed. No pertinent surgical history. Family History   Problem Relation Age of Onset   • Cardiomyopathy Father      I have reviewed and agree with the history as documented. E-Cigarette/Vaping   • E-Cigarette Use Never User      E-Cigarette/Vaping Substances   • Nicotine No    • THC No    • CBD No    • Flavoring No      Social History     Tobacco Use   • Smoking status: Never   • Smokeless tobacco: Never   Vaping Use   • Vaping Use: Never used   Substance Use Topics   • Alcohol use: Yes     Alcohol/week: 3.0 standard drinks of alcohol     Types: 3 Cans of beer per week   • Drug use: Never        Review of Systems   Constitutional: Positive for diaphoresis. Negative for fever. Respiratory: Positive for shortness of breath. Negative for cough. Cardiovascular: Positive for palpitations. Negative for chest pain and leg swelling. Gastrointestinal: Negative for abdominal pain and vomiting. Neurological: Positive for light-headedness. All other systems reviewed and are negative. Physical Exam  ED Triage Vitals   Temperature Pulse Respirations Blood Pressure SpO2   07/31/23 2150 07/31/23 2150 07/31/23 2150 07/31/23 2150 07/31/23 2150   99.1 °F (37.3 °C) (!) 117 20 (!) 195/117 100 %      Temp Source Heart Rate Source Patient Position - Orthostatic VS BP Location FiO2 (%)   07/31/23 2150 07/31/23 2150 07/31/23 2150 07/31/23 2150 --   Oral Monitor Sitting Left arm       Pain Score       08/01/23 0357       No Pain             Orthostatic Vital Signs  Vitals:    08/01/23 0718 08/01/23 0915 08/01/23 1114 08/01/23 1525   BP: 135/82 135/82 136/83 144/95   Pulse: 88 80 100 78   Patient Position - Orthostatic VS:           Physical Exam  Vitals and nursing note reviewed. Constitutional:       General: He is not in acute distress. Appearance: Normal appearance. He is not ill-appearing or toxic-appearing. HENT:      Head: Normocephalic and atraumatic. Right Ear: External ear normal.      Left Ear: External ear normal.      Nose: Nose normal.   Eyes:      General: No scleral icterus. Right eye: No discharge. Left eye: No discharge. Extraocular Movements: Extraocular movements intact. Conjunctiva/sclera: Conjunctivae normal.   Cardiovascular:      Rate and Rhythm: Tachycardia present. Heart sounds: Normal heart sounds. No murmur heard. No friction rub. No gallop. Pulmonary:      Effort: Pulmonary effort is normal. No respiratory distress. Breath sounds: Normal breath sounds. Abdominal:      General: Abdomen is flat. There is no distension. Palpations: Abdomen is soft. There is no mass. Tenderness: There is no abdominal tenderness. Genitourinary:     Comments: Deferred  Musculoskeletal:      Right lower leg: No edema. Left lower leg: No edema. Skin:     General: Skin is warm and dry. Neurological:      General: No focal deficit present. Mental Status: He is alert.          ED Medications  Medications   acetaminophen (TYLENOL) tablet 650 mg (has no administration in time range)   levalbuterol (XOPENEX) inhalation solution 1.25 mg (has no administration in time range)   sodium chloride 0.9 % bolus 1,000 mL (0 mL Intravenous Stopped 8/1/23 0233)   magnesium sulfate IVPB (premix) SOLN 1 g (1 g Intravenous New Bag 8/1/23 0323)   potassium chloride (K-DUR,KLOR-CON) CR tablet 40 mEq (40 mEq Oral Given 8/1/23 1355)       Diagnostic Studies  Results Reviewed     Procedure Component Value Units Date/Time    TSH, 3rd generation with Free T4 reflex [[id number]]  (Normal) Collected: 08/01/23 0500    Lab Status: Final result Specimen: Blood from Arm, Left Updated: 08/01/23 0748     TSH 3RD GENERATON 1.704 uIU/mL     Comprehensive metabolic panel [[id number]]  (Abnormal) Collected: 08/01/23 0500    Lab Status: Final result Specimen: Blood from Arm, Left Updated: 08/01/23 4115     Sodium 140 mmol/L      Potassium 3.4 mmol/L      Chloride 111 mmol/L      CO2 24 mmol/L      ANION GAP 5 mmol/L      BUN 9 mg/dL      Creatinine 0.75 mg/dL      Glucose 90 mg/dL      Calcium 9.0 mg/dL      AST 17 U/L      ALT 50 U/L      Alkaline Phosphatase 65 U/L      Total Protein 7.7 g/dL      Albumin 3.8 g/dL      Total Bilirubin 0.37 mg/dL      eGFR 127 ml/min/1.73sq m     Narrative:      [place] guidelines for Chronic Kidney Disease (CKD):   •  Stage 1 with normal or high GFR (GFR > 90 mL/min/1.73 square meters)  •  Stage 2 Mild CKD (GFR = 60-89 mL/min/1.73 square meters)  •  Stage 3A Moderate CKD (GFR = 45-59 mL/min/1.73 square meters)  •  Stage 3B Moderate CKD (GFR = 30-44 mL/min/1.73 square meters)  •  Stage 4 Severe CKD (GFR = 15-29 mL/min/1.73 square meters)  •  Stage 5 End Stage CKD (GFR <15 mL/min/1.73 square meters)  Note: GFR calculation is accurate only with a steady state creatinine    Magnesium [[id number]]  (Normal) Collected: 08/01/23 0500    Lab Status: Final result Specimen: Blood from Arm, Left Updated: 08/01/23 0637     Magnesium 2.6 mg/dL     APTT [[id number]]  (Normal) Collected: 08/01/23 0500    Lab Status: Final result Specimen: Blood from Arm, Left Updated: 08/01/23 0625     PTT 32 seconds     Protime-INR [[id number]]  (Abnormal) Collected: 08/01/23 0500    Lab Status: Final result Specimen: Blood from Arm, Left Updated: 08/01/23 0625     Protime 14.6 seconds      INR 1.12    CBC and differential [[id number]] Collected: 08/01/23 0500    Lab Status: Final result Specimen: Blood from Arm, Left Updated: 08/01/23 0604     WBC 10.05 Thousand/uL      RBC 5.44 Million/uL      Hemoglobin 16.1 g/dL      Hematocrit 45.7 %      MCV 84 fL      MCH 29.6 pg      MCHC 35.2 g/dL      RDW 11.9 %      MPV 9.3 fL      Platelets 070 Thousands/uL      nRBC 0 /100 WBCs      Neutrophils Relative 55 %      Immat GRANS % 0 %      Lymphocytes Relative 33 %      Monocytes Relative 8 %      Eosinophils Relative 3 %      Basophils Relative 1 %      Neutrophils Absolute 5.68 Thousands/µL      Immature Grans Absolute 0.03 Thousand/uL      Lymphocytes Absolute 3.28 Thousands/µL      Monocytes Absolute 0.75 Thousand/µL      Eosinophils Absolute 0.25 Thousand/µL      Basophils Absolute 0.06 Thousands/µL     D-dimer, quantitative [[id number]]  (Normal) Collected: 08/01/23 0033    Lab Status: Final result Specimen: Blood from Arm, Right Updated: 08/01/23 0118     D-Dimer, Quant <0.27 ug/ml [facility]     Comprehensive metabolic panel [[id number]]  (Abnormal) Collected: 07/31/23 2303    Lab Status: Final result Specimen: Blood from Arm, Left Updated: 08/01/23 0032     Sodium 140 mmol/L      Potassium 3.7 mmol/L      Chloride 108 mmol/L      CO2 27 mmol/L      ANION GAP 5 mmol/L      BUN 12 mg/dL      Creatinine 0.89 mg/dL      Glucose 104 mg/dL      Calcium 9.9 mg/dL      AST 26 U/L      ALT 61 U/L      Alkaline Phosphatase 75 U/L      Total Protein 8.7 g/dL      Albumin 4.2 g/dL      Total Bilirubin 0.38 mg/dL      eGFR 118 ml/min/1.73sq m     Narrative:      [place] guidelines for Chronic Kidney Disease (CKD):   •  Stage 1 with normal or high GFR (GFR > 90 mL/min/1.73 square meters)  •  Stage 2 Mild CKD (GFR = 60-89 mL/min/1.73 square meters)  •  Stage 3A Moderate CKD (GFR = 45-59 mL/min/1.73 square meters)  •  Stage 3B Moderate CKD (GFR = 30-44 mL/min/1.73 square meters)  •  Stage 4 Severe CKD (GFR = 15-29 mL/min/1.73 square meters)  •  Stage 5 End Stage CKD (GFR <15 mL/min/1.73 square meters)  Note: GFR calculation is accurate only with a steady state creatinine    HS Troponin 0hr (reflex protocol) [[id number]]  (Normal) Collected: 07/31/23 2303    Lab Status: Final result Specimen: Blood from Arm, Left Updated: 07/31/23 2342     hs TnI 0hr 3 ng/L     CBC and differential [[id number]] (Abnormal) Collected: 07/31/23 2303    Lab Status: Final result Specimen: Blood from Arm, Left Updated: 07/31/23 2316     WBC 11.12 Thousand/uL      RBC 5.85 Million/uL      Hemoglobin 16.9 g/dL      Hematocrit 49.5 %      MCV 85 fL      MCH 28.9 pg      MCHC 34.1 g/dL      RDW 11.8 %      MPV 8.9 fL      Platelets 973 Thousands/uL      nRBC 0 /100 WBCs      Neutrophils Relative 65 %      Immat GRANS % 0 %      Lymphocytes Relative 25 %      Monocytes Relative 7 %      Eosinophils Relative 2 %      Basophils Relative 1 %      Neutrophils Absolute 7.31 Thousands/µL      Immature Grans Absolute 0.03 Thousand/uL      Lymphocytes Absolute 2.76 Thousands/µL      Monocytes Absolute 0.73 Thousand/µL      Eosinophils Absolute 0.22 Thousand/µL      Basophils Absolute 0.07 Thousands/µL                  XR chest portable   Final Result by Matt Haas DO (08/01 1132)      No evidence of acute cardiopulmonary disease. Resident: Gavin Thompson, the attending radiologist, have reviewed the images and agree with the final report above.       Workstation performed: [id number]         MRI inpatient order    (Results Pending)         Procedures  ECG 12 Lead Documentation Only    Date/Time: 7/31/2023 11:50 PM    Performed by: Vane Ferro DO  Authorized by: Vane Ferro DO    Indications / Diagnosis:  Palpitations  ECG reviewed by me, the ED Provider: yes    Patient location:  ED  Previous ECG:     Previous ECG:  Unavailable  Interpretation:     Interpretation: abnormal    Rate:     ECG rate assessment: tachycardic    Rhythm:     Rhythm: sinus tachycardia    Ectopy:     Ectopy: none    QRS:     QRS axis:  Normal  ST segments:     ST segments:  Normal  T waves:     T waves: normal    Comments:      Jaswinder          ED Course  ED Course as of 08/01/23 1538   Tue Aug 01, 2023   0124 D-Dimer, Quant: <0.27                             SBIRT 22yo+    Flowsheet Row Most Recent Value   Initial Alcohol Screen: US AUDIT-C     1. How often do you have a drink containing alcohol? 0 Filed at: 07/31/2023 2152   Audit-C Score 0 Filed at: 07/31/2023 2152   MELCHOR: How many times in the past year have you. .. Used an illegal drug or used a prescription medication for non-medical reasons? Never Filed at: 07/31/2023 2152        CANDIS Risk Score    Flowsheet Row Most Recent Value   Age >= 72 0 Filed at: 08/01/2023 0209   Known CAD (stenosis >= 50%) 0 Filed at: 08/01/2023 0209   Recent (<=24 hrs) Service Angina 0 Filed at: 08/01/2023 0209   ST Deviation >= 0.5 mm 0 Filed at: 08/01/2023 0209   3+ CAD Risk Factors (FHx, HTN, HLP, DM, Smoker) 0 Filed at: 08/01/2023 0209   Aspirin Use Past 7 Days 0 Filed at: 08/01/2023 0209   Elevated Cardiac Markers 0 Filed at: 08/01/2023 0209   CANDIS Risk Score (Calculated) 0 Filed at: 08/01/2023 0209              Medical Decision Making  Patient is a 17-year-old male presenting for evaluation of palpitations. Based on history and evaluation, differential diagnosis includes but is not limited to: Arrhythmia, electrolyte disturbance, thyroid disturbance, pulmonary embolism. The patient does show me Apple Watch tracings that seem to demonstrate ventricular tachycardia. He does not have any evidence of ventricular tachycardia here in the emergency department, however we will keep him on the monitor and likely plan to admit secondary to this concerning tracing. Plan: CBC, CMP, ECG, troponin, CXR, already sent as first nursing, will add TSH and D-dimer, fluids, and reassess    Labs unremarkable including a negative troponin and negative D-dimer. Chest x-ray without acute cardiopulmonary disease on my independent interpretation. ECG with S1Q3T3 however otherwise unremarkable. Patient continues to be well-appearing on my reassessment, he is still slightly tachycardic.   No evidence of ventricular tachycardia here in the emergency department, however he does have the concerning tracing showing such as discussed above. We will plan to admit for further evaluation of this by cardiology/EP. Patient seems understand this plan and is agreeable. All questions answered. Patient admitted. I independently reviewed this patient's chart. I considered his chronic medical conditions in my medical decision making. Amount and/or Complexity of Data Reviewed  Labs: ordered. Decision-making details documented in ED Course. Risk  Decision regarding hospitalization. Disposition  Final diagnoses:   Palpitations     Time reflects when diagnosis was documented in both MDM as applicable and the Disposition within this note     Time User Action Codes Description Comment    8/1/2023  1:47 AM Juan José Milian Add [R00.2] Palpitations       ED Disposition     ED Disposition   Admit    Condition   Stable    Date/Time   Tue Aug 1, 2023  1:47 AM    Comment   Case was discussed with HARSH and the patient's admission status was agreed to be Admission Status: observation status to the service of Dr. Zackary Rosado . Follow-up Information    None         Current Discharge Medication List      CONTINUE these medications which have NOT CHANGED    Details   albuterol (ProAir HFA) 90 mcg/act inhaler Inhale 2 puffs every 6 (six) hours as needed for wheezing  Qty: 18 g, Refills: 0    Comments: Substitution to a formulary equivalent within the same pharmaceutical class is authorized. Associated Diagnoses: Mild intermittent reactive airway disease without complication      Budeson-Glycopyrrol-Formoterol (Breztri Aerosphere) 160-9-4.8 MCG/ACT AERO Inhale 2 puffs 2 (two) times a day as needed (as needed) Rinse mouth after use. Qty: 10.7 g, Refills: 5    Associated Diagnoses: Mild intermittent reactive airway disease without complication      cetirizine (ZyrTEC) 10 mg tablet Take 10 mg by mouth in the morning.       famotidine (PEPCID) 40 MG tablet Take 1 tablet (40 mg total) by mouth daily  Qty: 30 tablet, Refills: 2    Associated Diagnoses: Belching; Epigastric discomfort      methylPREDNISolone 4 MG tablet therapy pack Use as directed on package  Qty: 21 each, Refills: 1    Associated Diagnoses: Mild intermittent reactive airway disease without complication      Multiple Vitamin (ONE-A-DAY MENS PO) Take by mouth      pantoprazole (PROTONIX) 40 mg tablet Take 1 tablet (40 mg total) by mouth daily  Qty: 90 tablet, Refills: 1    Associated Diagnoses: Gastroesophageal reflux disease, unspecified whether esophagitis present           No discharge procedures on file. PDMP Review     None           ED Provider  Attending physically available and evaluated Torie Aguilar. I managed the patient along with the ED Attending.     Electronically Signed by         Syeda Car DO  08/01/23 6001

## 2023-08-01 NOTE — CONSULTS
Consultation - Electrophysiology-Cardiology (EP)   Jade Slider Lisa 20 y.o. male MRN: [id number]  Unit/Bed#: [facility] 214-02 Encounter: [id number]      Inpatient consult to Electrophysiology  Consult performed by: Edilson Ellsworth PA-C  Consult ordered by: Alka DO Nima          Assessment/Plan     Assessment:  1. Symptomatic wide complex tachycardia   A.)  Documented on Apple Watch (see strips in Media)   B.)  Several prior similar episodes, no associated syncope   C.)  Unclear if VT versus SVT with aberrancy    D.)  Holter monitor 8/2022 showed 0.4% PACs, no sustained arrhythmias  2. Normal LV systolic function with LVEF 55% per echo this admission   A.)  Prior echo 3/2022 also showed structurally normal heart   B.)  No other imaging or ischemic work up noted   C.)  Family history (father) with idiopathic nonischemic cardiomyopathy with ICD in situ, no reported history of ICD shocks per family   3. Asthma  4. GERD  5. Obesity with BMI 35      Plan:  He has now had several isolated episodes of palpitations associated with presyncope. Fortunately, he denies prior episodes of syncope. Apple Watch tracings confirmed that his symptoms correlated with a wide-complex tachycardia, however at this time it is unclear whether it is truly ventricular tachycardia versus SVT with aberrancy. Recommend full cardiac work-up, including stress echocardiogram and cardiac MRI. We will then make further recommendations based on these findings. If the above testing is normal, we would then consider an electrophysiologic study to better differentiate VT versus SVT with aberrancy. If arrhythmias are noted, we would prefer to try ablation first; however if he has ventricular tachycardia not amenable to this we would then discuss ICD implantation. This plan was discussed with the patient and his mother. All questions were answered, and they are in agreement to proceed with further testing.      Continue to monitor telemetry. History of Present Illness   Physician Requesting Consult: Haydee Gonsalves DO  Reason for Consult / Principal Problem: wide complex tachycardia      HPI: Penny Mallory is a 20y.o. year old male with palpitations, normal LVEF with structurally normal heart, family history of idiopathic cardiomyopathy, asthma, GERD, and obesity. For the past several years, he has had infrequent episodes of palpitations accompanied by dizziness and presyncope. He experienced his most severe episode prior to admission. He reports sitting on his couch watching TV when he suddenly felt his heart racing, he became lightheaded, mildly short of breath, and presyncopal.  He was able to take an EKG tracing with his Apple Watch during his symptoms, and wide-complex tachycardia was noted. He reports the symptoms lasted for 10 minutes, and spontaneously resolved on their own. Follow-up EKG confirmed normal sinus rhythm. Given the severity of this episode as well as EKG findings, he presented to the hospital for further evaluation. He reports 3 prior episodes over the past several years - one while swimming, one while standing, and one after drinking an energy drink. He reports more mild symptoms with those episodes. He has since stopped drinking caffeine. He denies any possible triggers for his most recent episode, including recent illnesses, changes in medications, new over the counter medications (including stimulants), drugs, or alcohol. He is currently resting comfortably in bed, no acute complaints. Review of Systems  ROS as noted above, otherwise 12 point review of systems was performed and is negative. Historical Information   History reviewed. No pertinent past medical history. History reviewed. No pertinent surgical history.   Social History     Substance and Sexual Activity   Alcohol Use Yes   • Alcohol/week: 3.0 standard drinks of alcohol   • Types: 3 Cans of beer per week     Social History     Substance and Sexual Activity   Drug Use Never     Social History     Tobacco Use   Smoking Status Never   Smokeless Tobacco Never     Family History:   Family History   Problem Relation Age of Onset   • Cardiomyopathy Father        Meds/Allergies   Hospital Medications:   Current Facility-Administered Medications   Medication Dose Route Frequency   • acetaminophen (TYLENOL) tablet 650 mg  650 mg Oral Q6H PRN   • levalbuterol (XOPENEX) inhalation solution 1.25 mg  1.25 mg Nebulization Q8H PRN     Home Medications:   Medications Prior to Admission   Medication   • albuterol (ProAir HFA) 90 mcg/act inhaler   • Budeson-Glycopyrrol-Formoterol (Breztri Aerosphere) 160-9-4.8 MCG/ACT AERO   • cetirizine (ZyrTEC) 10 mg tablet   • famotidine (PEPCID) 40 MG tablet   • methylPREDNISolone 4 MG tablet therapy pack   • Multiple Vitamin (ONE-A-DAY MENS PO)   • pantoprazole (PROTONIX) 40 mg tablet       Allergies   Allergen Reactions   • Penicillins Rash       Objective   Vitals: Blood pressure 135/82, pulse 88, temperature 98.2 °F (36.8 °C), resp. rate 21, height 5' 9" (1.753 m), weight 108 kg (237 lb 12.8 oz), SpO2 97 %.   Orthostatic Blood Pressures    Flowsheet Row Most Recent Value   Blood Pressure 135/82 filed at 08/01/2023 0718   Patient Position - Orthostatic VS Lying filed at 08/01/2023 0015            Intake/Output Summary (Last 24 hours) at 8/1/2023 0957  Last data filed at 8/1/2023 4819  Gross per 24 hour   Intake 260 ml   Output --   Net 260 ml       Invasive Devices     Peripheral Intravenous Line  Duration           Peripheral IV 08/01/23 Right Antecubital <1 day                Physical Exam  GEN: NAD, alert and oriented x 3, well appearing  SKIN: dry without significant lesions or rashes  HEENT: NCAT, PERRL, EOMs intact  NECK: No JVD appreciated  CARDIOVASCULAR: RRR, normal S1, S2 without murmurs, rubs, or gallops appreciated  LUNGS: Clear to auscultation bilaterally without wheezes, rhonchi, or rales  ABDOMEN: Soft, nontender, nondistended  EXTREMITIES/VASCULAR: perfused without clubbing, cyanosis, or LE edema b/l  PSYCH: Normal mood and affect  NEURO: CN ll-Xll grossly intact      Lab Results: I have personally reviewed pertinent lab results. Results from last 7 days   Lab Units 08/01/23  0500 07/31/23  2303   WBC Thousand/uL 10.05 11.12*   HEMOGLOBIN g/dL 16.1 16.9   HEMATOCRIT % 45.7 49.5*   PLATELETS Thousands/uL 315 340     Results from last 7 days   Lab Units 08/01/23  0500 07/31/23  2303   POTASSIUM mmol/L 3.4* 3.7   CHLORIDE mmol/L 111* 108   CO2 mmol/L 24 27   BUN mg/dL 9 12   CREATININE mg/dL 0.75 0.89   CALCIUM mg/dL 9.0 9.9     Results from last 7 days   Lab Units 08/01/23  0500   INR  1.12   PTT seconds 32     Results from last 7 days   Lab Units 08/01/23  0500   MAGNESIUM mg/dL 2.6       Imaging: I have personally reviewed pertinent reports. ECHO: No results found for this or any previous visit. Results for orders placed during the hospital encounter of 03/31/22    Echo complete w/ contrast if indicated    Interpretation Summary  •  Left Ventricle: Left ventricular cavity size is normal. Wall thickness is normal. The left ventricular ejection fraction is 55-60%.  Systolic function is normal. Wall motion is normal.  •  Right Ventricle: Right ventricular cavity size is normal. Systolic function is normal.        CATH/STRESS TEST: no prior studies noted      EKG:         TELEMETRY: normal sinus rhythm with sinus arrhythmia noted, no recurrent wide complex tachycardia        VTE Prophylaxis: Sequential compression device (Venodyne)

## 2023-08-01 NOTE — ED ATTENDING ATTESTATION
7/31/2023  IElizabeth DO, saw and evaluated the patient. I have discussed the patient with the resident/non-physician practitioner and agree with the resident's/non-physician practitioner's findings, Plan of Care, and MDM as documented in the resident's/non-physician practitioner's note, except where noted. All available labs and Radiology studies were reviewed. I was present for key portions of any procedure(s) performed by the resident/non-physician practitioner and I was immediately available to provide assistance. At this point I agree with the current assessment done in the Emergency Department. I have conducted an independent evaluation of this patient a history and physical is as follows:    19 yo male presents for evaluation of palpitations, diaphoresis, near syncope. He happened to take a 1 lead ECG tracing on his apple watch which is shown below: While this is a 1 lead tracing, and relatively non diagnostic - he was symptomatic at the time. Symptoms and tracing seem concerning for VT. He recorded additional consecutive 30s tracings shown below:              ED Course           Unfortunately these 2nd and 3rd tracings are considerably less useful and non diagnostic due to significant artifact. His symptoms resolved spontaneously and he took another tracing shortly afterward:      He has had 1-2 other episodes of similar symptoms remotely. He stopped drinking caffeine as a result. He currently only feels slightly fast heart rate, otherwise feels well. No hx of syncope, no famhx SCD. No leg pain or swelling. No hx of DVT/PE. ECG today shows sinus tachycardia with an S1Q3T3 pattern. Otherwise unremarkable. Imp: near syncope, concern for VT based on symptoms and home 1 lead ECG tracing plan: FNP cardiac eval already completed. Added TSH w/reflex T4. Added D-dimer due to S1Q3T3 although this finding has quite low specificity for PE (~2%).  Will discuss with cardiology following final results. Consider admission for tele, possible EP consultation.       Critical Care Time  Procedures

## 2023-08-01 NOTE — ASSESSMENT & PLAN NOTE
· Episode this past Thursday in which patient coughed and had palpitations as well as dizziness and nausea. Another episode this evening in which patient was lying on his left side and felt similar symptoms. · Patient wears an Apple Watch and patient's Apple watch from episode this past Thursday appears to show episode of VT on the watch itself which Dr. Chasity Patel in the ER uploaded in his note from earlier today. · EKG in the ER currently sinus tachycardia, noted S1 Q3, T3, rate 112, no high-grade AV block, no stemi, no contiguous t wave inversions, QTc 453  · Troponin negative x1  · D-Dimer negative  · Denies any current symptoms  · Reports that he has had about 5 lifetime episodes of the similar symptoms which began he thinks in about 2021. Reports a distinctive episode in July 2022 of feeling dizzy and having palpit[place] and resolved on its own. He then went to see cardiology outpatient who had patient wear a Holter monitor for a day in August 2022 in which no episodes of VT were identified but patient also reports he had no symptoms for this day he wore the Holter monitor. · Previously had an echo in March 2022 with normal LVEF  · Patient's father has a history of "idiopathic cardiomyopathy" in his early 45s and there is also significant cardiac history for his grandfather. Patient additionally has several siblings and one of his sisters has had similar symptoms  · History of asthma-denies any current shortness of breath or wheezing  · Admit to medicine on telemetry for monitoring of heart rhythm and for EP team evaluation of symptoms as well as the review of rhythm. Order 2D echocardiogram.  Order chest x-ray.   · Follow-up on TSH

## 2023-08-01 NOTE — PROGRESS NOTES
Post Admit Check:    Patient seen and examined, no acute complaints or recurrent episodes. Discussed plan with EP, cardiac MRi and stress echo have been ordered. Updated echo was done, EF 55%. No RWMA. No significant valvular abnormalities. Plan  · Continue telemetry  · Await further testing as ordered by EP  · Monitor electrolytes, keep K > 4 and Mag > 2, replete PRN  · Will change to inpatient as require > 2 MN at this point for further cardiac work up.        [employer], CRNP  SLIM

## 2023-08-01 NOTE — RESPIRATORY THERAPY NOTE
RT Protocol Note  Joann Gonzalez 20 y.o. male MRN: [id number]  Unit/Bed#: CW2 214-02 Encounter: [id number]    Assessment    Principal Problem:    Palpitations      Home Pulmonary Medications:  yes       History reviewed. No pertinent past medical history. Social History     Socioeconomic History    Marital status: Single     Spouse name: None    Number of children: None    Years of education: None    Highest education level: None   Occupational History    None   Tobacco Use    Smoking status: Never    Smokeless tobacco: Never   Vaping Use    Vaping Use: Never used   Substance and Sexual Activity    Alcohol use: None    Drug use: None    Sexual activity: None   Other Topics Concern    None   Social History Narrative    None     Social Determinants of Health     Financial Resource Strain: Not on file   Food Insecurity: Not on file   Transportation Needs: Not on file   Physical Activity: Not on file   Stress: Not on file   Social Connections: Not on file   Intimate Partner Violence: Not on file   Housing Stability: Not on file       Subjective         Objective    Physical Exam:   Assessment Type: Assess only  General Appearance: Alert, Awake  Respiratory Pattern: Normal  Chest Assessment: Chest expansion symmetrical  Bilateral Breath Sounds: Clear    Vitals:  Blood pressure 139/96, pulse (!) 106, temperature 99.1 °F (37.3 °C), temperature source Oral, resp. rate 21, SpO2 97 %. Imaging and other studies:    08/01/23 0330   Respiratory Protocol   Protocol Initiated? Yes   Protocol Selection Respiratory   Language Barrier? No   Medical & Social History Reviewed? Yes   Diagnostic Studies Reviewed? Yes   Physical Assessment Performed? Yes   Respiratory Plan Home Bronchodilator Patient pathway   Respiratory Assessment   Assessment Type Assess only   General Appearance Alert; Awake   Respiratory Pattern Normal   Chest Assessment Chest expansion symmetrical   Bilateral Breath Sounds Clear   Resp Comments Assessment of Respiratory protocol. Pt here for Palpitations. Pt did not come in for respiratory issues. Here for evaluation for cardiac issues. Not in any distress. Takes respiratory treatments as needed for his Asthma. BS clear. 97% on R/A. Will continue as per home regiment. Prn tx. Plan    Respiratory Plan: Home Bronchodilator Patient pathway        Resp Comments: (P) Assessment of Respiratory protocol. Pt here for Palpitations. Pt did not come in for respiratory issues. Here for evaluation for cardiac issues. Not in any distress. Takes respiratory treatments as needed for his Asthma. BS clear. 97% on R/A. Will continue as per home regiment. Prn tx.

## 2023-08-01 NOTE — PLAN OF CARE
Problem: PAIN - ADULT  Goal: Verbalizes/displays adequate comfort level or baseline comfort level  Description: Interventions:  - Encourage patient to monitor pain and request assistance  - Assess pain using appropriate pain scale  - Administer analgesics based on type and severity of pain and evaluate response  - Implement non-pharmacological measures as appropriate and evaluate response  - Consider cultural and social influences on pain and pain management  - Notify physician/advanced practitioner if interventions unsuccessful or patient reports new pain  Outcome: Progressing     Problem: INFECTION - ADULT  Goal: Absence or prevention of progression during hospitalization  Description: INTERVENTIONS:  - Assess and monitor for signs and symptoms of infection  - Monitor lab/diagnostic results  - Monitor all insertion sites, i.e. indwelling lines, tubes, and drains  - Monitor endotracheal if appropriate and nasal secretions for changes in amount and color  - [place] appropriate cooling/warming therapies per order  - Administer medications as ordered  - Instruct and encourage patient and family to use good hand hygiene technique  - Identify and instruct in appropriate isolation precautions for identified infection/condition  Outcome: Progressing  Goal: Absence of fever/infection during neutropenic period  Description: INTERVENTIONS:  - Monitor WBC    Outcome: Progressing     Problem: SAFETY ADULT  Goal: Patient will remain free of falls  Description: INTERVENTIONS:  - Educate patient/family on patient safety including physical limitations  - Instruct patient to call for assistance with activity   - Consult OT/PT to assist with strengthening/mobility   - Keep Call bell within reach  - Keep bed low and locked with side rails adjusted as appropriate  - Keep care items and personal belongings within reach  - Initiate and maintain comfort rounds  - Make Fall Risk Sign visible to staff  - Offer Toileting every  Hours, in advance of need  - Initiate/Maintain alarm  - Obtain necessary fall risk management equipment:   - Apply yellow socks and bracelet for high fall risk patients  - Consider moving patient to room near nurses station  Outcome: Progressing  Goal: Maintain or return to baseline ADL function  Description: INTERVENTIONS:  -  Assess patient's ability to carry out ADLs; assess patient's baseline for ADL function and identify physical deficits which impact ability to perform ADLs (bathing, care of mouth/teeth, toileting, grooming, dressing, etc.)  - Assess/evaluate cause of self-care deficits   - Assess range of motion  - Assess patient's mobility; develop plan if impaired  - Assess patient's need for assistive devices and provide as appropriate  - Encourage maximum independence but intervene and supervise when necessary  - Involve family in performance of ADLs  - Assess for home care needs following discharge   - Consider OT consult to assist with ADL evaluation and planning for discharge  - Provide patient education as appropriate  Outcome: Progressing  Goal: Maintains/Returns to pre admission functional level  Description: INTERVENTIONS:  - Perform BMAT or MOVE assessment daily.   - Set and communicate daily mobility goal to care team and patient/family/caregiver. - Collaborate with rehabilitation services on mobility goals if consulted  - Perform Range of Motion  times a day. - Reposition patient every  hours.   - Dangle patient  times a day  - Stand patient  times a day  - Ambulate patient  times a day  - Out of bed to chair  times a day   - Out of bed for meals  times a day  - Out of bed for toileting  - Record patient progress and toleration of activity level   Outcome: Progressing     Problem: DISCHARGE PLANNING  Goal: Discharge to home or other facility with appropriate resources  Description: INTERVENTIONS:  - Identify barriers to discharge w/patient and caregiver  - Arrange for needed discharge resources and transportation as appropriate  - Identify discharge learning needs (meds, wound care, etc.)  - Arrange for interpretive services to assist at discharge as needed  - Refer to Case Management Department for coordinating discharge planning if the patient needs post-hospital services based on physician/advanced practitioner order or complex needs related to functional status, cognitive ability, or social support system  Outcome: Progressing     Problem: Knowledge Deficit  Goal: Patient/family/caregiver demonstrates understanding of disease process, treatment plan, medications, and discharge instructions  Description: Complete learning assessment and assess knowledge base.   Interventions:  - Provide teaching at level of understanding  - Provide teaching via preferred learning methods  Outcome: Progressing

## 2023-08-02 ENCOUNTER — APPOINTMENT (INPATIENT)
Dept: NON INVASIVE DIAGNOSTICS | Facility: HOSPITAL | Age: 23
DRG: 262 | End: 2023-08-02
Payer: COMMERCIAL

## 2023-08-02 LAB
ANION GAP SERPL CALCULATED.3IONS-SCNC: 4 MMOL/L
ATRIAL RATE: 67 BPM
BUN SERPL-MCNC: 14 MG/DL (ref 5–25)
CALCIUM SERPL-MCNC: 10 MG/DL (ref 8.3–10.1)
CHLORIDE SERPL-SCNC: 109 MMOL/L (ref 96–108)
CO2 SERPL-SCNC: 28 MMOL/L (ref 21–32)
CREAT SERPL-MCNC: 0.94 MG/DL (ref 0.6–1.3)
GFR SERPL CREATININE-BSD FRML MDRD: 112 ML/MIN/1.73SQ M
GLUCOSE SERPL-MCNC: 97 MG/DL (ref 65–140)
MAGNESIUM SERPL-MCNC: 2.4 MG/DL (ref 1.6–2.6)
MAX DIASTOLIC BP: 84 MMHG
MAX HEART RATE: 196 BPM
MAX HR PERCENT: 100 %
MAX HR: 196 BPM
MAX PREDICTED HEART RATE: 195 BPM
MAX. SYSTOLIC BP: 192 MMHG
P AXIS: 32 DEGREES
POTASSIUM SERPL-SCNC: 4.2 MMOL/L (ref 3.5–5.3)
PR INTERVAL: 168 MS
PROTOCOL NAME: NORMAL
QRS AXIS: 24 DEGREES
QRSD INTERVAL: 98 MS
QT INTERVAL: 396 MS
QTC INTERVAL: 418 MS
RATE PRESSURE PRODUCT: NORMAL
REASON FOR TERMINATION: NORMAL
SL CV LV EF: 55
SL CV STRESS RECOVERY BP: NORMAL MMHG
SL CV STRESS RECOVERY HR: 116 BPM
SL CV STRESS RECOVERY O2 SAT: 98 %
SODIUM SERPL-SCNC: 141 MMOL/L (ref 135–147)
STRESS ANGINA INDEX: 0
STRESS BASELINE BP: NORMAL MMHG
STRESS BASELINE HR: 82 BPM
STRESS O2 SAT REST: 99 %
STRESS PEAK HR: 196 BPM
STRESS POST ESTIMATED WORKLOAD: 11.7 METS
STRESS POST EXERCISE DUR MIN: 10 MIN
STRESS POST O2 SAT PEAK: 98 %
STRESS POST PEAK BP: 192 MMHG
T WAVE AXIS: 3 DEGREES
TARGET HR FORMULA: NORMAL
TEST INDICATION: NORMAL
TIME IN EXERCISE PHASE: NORMAL
VENTRICULAR RATE: 67 BPM

## 2023-08-02 PROCEDURE — 99232 SBSQ HOSP IP/OBS MODERATE 35: CPT | Performed by: INTERNAL MEDICINE

## 2023-08-02 PROCEDURE — 83735 ASSAY OF MAGNESIUM: CPT | Performed by: NURSE PRACTITIONER

## 2023-08-02 PROCEDURE — 99232 SBSQ HOSP IP/OBS MODERATE 35: CPT | Performed by: NURSE PRACTITIONER

## 2023-08-02 PROCEDURE — A9585 GADOBUTROL INJECTION: HCPCS | Performed by: PHYSICIAN ASSISTANT

## 2023-08-02 PROCEDURE — 80048 BASIC METABOLIC PNL TOTAL CA: CPT | Performed by: NURSE PRACTITIONER

## 2023-08-02 PROCEDURE — 93350 STRESS TTE ONLY: CPT

## 2023-08-02 PROCEDURE — 93350 STRESS TTE ONLY: CPT | Performed by: INTERNAL MEDICINE

## 2023-08-02 PROCEDURE — 94664 DEMO&/EVAL PT USE INHALER: CPT

## 2023-08-02 PROCEDURE — 93005 ELECTROCARDIOGRAM TRACING: CPT

## 2023-08-02 PROCEDURE — 93010 ELECTROCARDIOGRAM REPORT: CPT | Performed by: INTERNAL MEDICINE

## 2023-08-02 RX ORDER — GADOBUTROL 604.72 MG/ML
20 INJECTION INTRAVENOUS
Status: COMPLETED | OUTPATIENT
Start: 2023-08-02 | End: 2023-08-02

## 2023-08-02 RX ORDER — VANCOMYCIN HYDROCHLORIDE 1 G/200ML
1000 INJECTION, SOLUTION INTRAVENOUS ONCE
Status: DISCONTINUED | OUTPATIENT
Start: 2023-08-03 | End: 2023-08-03 | Stop reason: HOSPADM

## 2023-08-02 RX ORDER — CHLORHEXIDINE GLUCONATE 0.12 MG/ML
15 RINSE ORAL ONCE
Status: COMPLETED | OUTPATIENT
Start: 2023-08-02 | End: 2023-08-02

## 2023-08-02 RX ADMIN — CHLORHEXIDINE GLUCONATE 15 ML: 1.2 SOLUTION ORAL at 17:38

## 2023-08-02 RX ADMIN — GADOBUTROL 20 ML: 604.72 INJECTION INTRAVENOUS at 00:33

## 2023-08-02 NOTE — PROGRESS NOTES
Electrophysiology (EP) Progress Note - Jarocho Daily Gonzalofercydney 20 y.o. male MRN: [id number]    Unit/Bed#: -01 Encounter: [id number]      Subjective:   Patient seen and examined today. No significant events overnight. Denies lightheadedness, dizziness, syncope, headache, vision changes, diaphoresis, chest pain, palpitations, shortness of breath, PND, orthopnea, nausea, vomiting, abdominal pain or lower extremity edema. Hospital Course:   Allie Benavides is a 20y.o. year old male with palpitations, normal LVEF with structurally normal heart, family history of idiopathic cardiomyopathy, asthma, GERD, and obesity. He presented with palpitations causing him symptoms of dizzyness, lightheadedness, shortness of breath and presyncope. EKG tracing with his Apple Watch during the symptoms showed a wide-complex tachycardia. Symptoms lasted 10 minutes with spontaneous resolution. EKG in the hospital shows normal sinus rhythm. Vitals: Blood pressure 132/94, pulse (!) 112, temperature 98.4 °F (36.9 °C), resp. rate 20, height 5' 9" (1.753 m), weight 108 kg (237 lb), SpO2 95 %. , Body mass index is 35 kg/m².,   Orthostatic Blood Pressures    Flowsheet Row Most Recent Value   Blood Pressure 132/94 filed at 08/02/2023 1032   Patient Position - Orthostatic VS Lying filed at 08/02/2023 2723            Intake/Output Summary (Last 24 hours) at 8/2/2023 1124  Last data filed at 8/1/2023 1900  Gross per 24 hour   Intake 240 ml   Output --   Net 240 ml       Review of System:  Review of system was conducted and was negative except for as stated in the subjective course.     Physical Exam:    GEN: Allie Benavides appears well, alert and oriented x 3, pleasant and cooperative   HEENT:  Normocephalic, atraumatic, anicteric, moist mucous membranes  NECK: No JVD or carotid bruits   HEART: Normal sinus rhythm, Normal rate, normal S1 and S2, no murmurs, clicks, gallops or rubs   LUNGS: Clear to auscultation bilaterally; no wheezes, rales, or rhonchi; respiration nonlabored   ABDOMEN:  Normoactive bowel sounds, soft, no tenderness, no distention  EXTREMITIES: peripheral pulses palpable; no edema  NEURO: no gross focal findings; cranial nerves grossly intact   SKIN:  Dry, intact, warm to touch      Current Facility-Administered Medications:   •  acetaminophen (TYLENOL) tablet 650 mg, 650 mg, Oral, Q6H PRN, Toby Man DO  •  chlorhexidine (PERIDEX) 0.12 % oral rinse 15 mL, 15 mL, Swish & Spit, Once, Cherie Issa PA-C  •  levalbuterol [facility]) inhalation solution 1.25 mg, 1.25 mg, Nebulization, Q8H PRN, Toby Man DO  •  [START ON 8/3/2023] vancomycin (VANCOCIN) IVPB (premix in dextrose) 1,000 mg 200 mL, 1,000 mg, Intravenous, Once, Cherie Issa PA-C    Labs & Results:  Results from last 7 days   Lab Units 07/31/23  2303   HS TNI 0HR ng/L 3         Results from last 7 days   Lab Units 08/02/23  0518 08/01/23  0500 07/31/23  2303   POTASSIUM mmol/L 4.2 3.4* 3.7   CO2 mmol/L 28 24 27   CHLORIDE mmol/L 109* 111* 108   BUN mg/dL 14 9 12   CREATININE mg/dL 0.94 0.75 0.89     Results from last 7 days   Lab Units 08/01/23  0500 07/31/23  2303   HEMOGLOBIN g/dL 16.1 16.9   HEMATOCRIT % 45.7 49.5*   PLATELETS Thousands/uL 315 340           Telemetry:   Personally reviewed by Nadege Terry DO: Normal sinus rhythm with PACs. Assessment/Plan:  Principal Problem:    Palpitations    1. Symptomatic wide-complex tachycardia  Wide-complex tachycardia documented via Apple Watch (strips in media) with symptoms of lightheadedness, dizziness, shortness of breath, presyncope. Obtain Holter monitor 8/22 which showed 0.4% PACs with no sustained arrhythmia. Complete echo 8/1/2023 showed EF 55% and structurally normal heart. Differential for this rhythm includes SVT with aberrancy versus autonomic VTE.   Plan:  - Follow-up exercise stress test to rule out coronary disease.  - Follow-up cardiac MRI to evaluate for scar and infiltrative/inflammatory disease.  - We will plan for ablation procedure tomorrow 8/3. If VT cannot be induced during procedure patient will go for ICD implantation.  -This was discussed with the patient and he is agreeable to this plan. Case discussed and reviewed with Dr. Zack Craig who agrees with my assessment and plan. Thank you for involving us in the care of your patient. Esa Hernandez DO  Cardiology Fellow   PGY-4    ** Please Note: Fluency DirectDictation voice to text software may have been used in the creation of this document.  **

## 2023-08-02 NOTE — CASE MANAGEMENT
Case Management Assessment & Discharge Planning Note    Patient name Martha Marin  Location /-39 MRN [id number]  : 2000 Date 2023       Current Admission Date: 2023  Current Admission Diagnosis:Palpitations   Patient Active Problem List    Diagnosis Date Noted   • Palpitations 2023   • Mild intermittent reactive airway disease without complication       LOS (days): 1  Geometric Mean LOS (GMLOS) (days): 1.80  Days to GMLOS:0.7     OBJECTIVE:    Risk of Unplanned Readmission Score: 6.72         Current admission status: Inpatient       Preferred Pharmacy:   [id number]  [address] [place] [address]  Phone: [phone] Fax: [phone]    Primary Care Provider: Nichole James DO    Primary Insurance: BLUE CROSS  Secondary Insurance:     ASSESSMENT:  Asha Proxies    There are no active Health Care Proxies on file. Patient Information  Admitted from[de-identified] Home  Mental Status: Alert  During Assessment patient was accompanied by: Friend  Assessment information provided by[de-identified] Patient  Primary Caregiver: Self  Support Systems: Self, Parent  Home entry access options.  Select all that apply.: Stairs  Number of steps to enter home.: 3  Do the steps have railings?: No  In the last 12 months, was there a time when you were not able to pay the mortgage or rent on time?: No  Homeless/housing insecurity resource given?: N/A  Living Arrangements: Lives w/ Parent(s)  Is patient a ?: No    Activities of Daily Living Prior to Admission  Functional Status: Independent  Completes ADLs independently?: Yes  Ambulates independently?: Yes  Does patient use assisted devices?: No  Does patient currently own DME?: No  Does patient have a history of Outpatient Therapy (PT/OT)?: No  Does the patient have a history of Short-Term Rehab?: No  Does patient have a history of HHC?: No  Does patient currently have [address]?: No         Patient Information Continued  Income Source: Employed  Does patient have prescription coverage?: Yes  Within the past 12 months, you worried that your food would run out before you got the money to buy more.: Never true  Within the past 12 months, the food you bought just didn't last and you didn't have money to get more.: Never true  Food insecurity resource given?: N/A  Does patient receive dialysis treatments?: No         Means of Transportation  Means of Transport to [facility][de-identified] Drives Self  In the past 12 months, has lack of transportation kept you from medical appointments or from getting medications?: No  In the past 12 months, has lack of transportation kept you from meetings, work, or from getting things needed for daily living?: No  Was application for public transport provided?: N/A        DISCHARGE DETAILS:  Met at bedside with patient. Lives with parents, IADL's, works full time, drives, does not own DME. Family to transport at [place].  to continue to support.

## 2023-08-02 NOTE — RESPIRATORY THERAPY NOTE
08/02/23 0840   Respiratory Assessment   Assessment Type Assess only   General Appearance Alert; Awake   Respiratory Pattern Normal   Chest Assessment Chest expansion symmetrical   Bilateral Breath Sounds Clear   Resp Comments Bilateral BS clear. no distress noted.  No indicationf for UDN   O2 Device ra   Additional Assessments   Pulse 80   Respirations 18   SpO2 99 %   Position Semi-Brooke's

## 2023-08-02 NOTE — ASSESSMENT & PLAN NOTE
Patient presented with palpitations causing him symptoms of lightheadedness and dizziness as well as SOB. EKG on his apple watch showed a wide complex tachycardia, ? VT. He was admitted for further evaluation and EP consult. · Holter in the past negative for arhythmia   · EP recommended cardiac MRI which is pending read  · Echo showed EF 55% without any structural abnormalities. · Stress echo negative   · Plan for ablation procedure tomorrow with EP, if VT cannot be induced during procedure then plan will be for ICD.    · NPO after MN  · Continue telemetry, no events upon review  · Monitor electrolytes PRN

## 2023-08-02 NOTE — UTILIZATION REVIEW
Initial Clinical Review    OBSERVATION 8/1 CHANGED TO INPATIENT ON 8/1 @ 1231 - COMPLETE CARDIAC WORK UP FOR SYMPTOMATIC WIDE COMPLEX TACHYCARDIA AND EPS CONSULT     Admission: Date/Time/Statement:   Admission Orders (From admission, onward)     Ordered        08/01/23 1231  Inpatient Admission  Once                      Orders Placed This Encounter   Procedures   • Inpatient Admission     Standing Status:   Standing     Number of Occurrences:   1     Order Specific Question:   Level of Care     Answer:   Med Surg [16]     Order Specific Question:   Estimated length of stay     Answer:   More than 2 Midnights     Order Specific Question:   Certification     Answer:   I certify that inpatient services are medically necessary for this patient for a duration of greater than two midnights. See H&P and MD Progress Notes for additional information about the patient's course of treatment. ED Arrival Information     Expected   -    Arrival   7/31/2023 21:46    Acuity   Urgent            Means of arrival   Walk-In    Escorted by   Self    Service   Hospitalist    Admission type   Emergency            Arrival complaint   Shortness of breath           Chief Complaint   Patient presents with   • Rapid Heart Rate     Hx of tachycardia. Pt c/o fluttering in chest; denies chest pain. Pt states SOB and chest tightness. Per pt, HR has been 120-140 all day       Initial Presentation: 20 y.o. male presents to the ED from home with c/o palpitations, dizziness which has occurred off and on about 5 x since 2021. Pt notes his apple watch showed VT last week. Pt had cardiac work up and Holter in 2022 w/o any arrhythmia being detected. PMH: strong family h/o early cardiac disease, asthma. In the ED pt was tachycardic and HTN. Labs - leukocytosis. Imaging - no acute disease. ECG - SR, ST. Treated with IV fluids, IV Mag bolus. On exam normal rate and rhythm.   He is admitted to OBSERVATION status with   Palpitations - tele, EPS consult, Echo, CXR, TSH. Post admit note:  Pt was changed to [address] [place] [address]. Needs complete cardiac work up. 8/1 EPS Consult - Symptomatic wide complex tachycardia - seen on Apple watch, with pre-syncope w/o associated syncope, unclear if VT vs SVT w/ aberrancy. Normal LV systolic function LVEF 05% this admission. Pt will need complete cardiac work up with stress echo, cardiac MRI. If work up normal will need EPS to differentiate the type of tachycardia. If arrhythmias are noted will do ablation first.  IF VT and not ammenable to this would need discussion about ICD implantation. Continue on Tele. Date: 8/2   Day 2 OF IP STATUS:   VS stable. Work up continue with cardiac MRI and stress echo. Recommendations to follow. No changes overnight. On exam today normal S1S2, no palpitations. remains on Tele. Plan for ablation procedure tomorrow with EP, if VT cannot be induced during procedure then plan will be for ICD, NPO p MN. Waiting cardiac MRI completion. Date: 8/3  Day 3 IP:   Plan for ablation procedure today with EP, if VT cannot be induced during procedure then plan will be for ICD. stress echo negative. Cardiac MRI revealed normal bilateral ventricular sizes, no evidence of ARVD, normal bilateral atria, no valvular abnormalities, and no evidence of myocardial inflammation/infiltrative disease/scarring.      ED Triage Vitals   Temperature Pulse Respirations Blood Pressure SpO2   07/31/23 2150 07/31/23 2150 07/31/23 2150 07/31/23 2150 07/31/23 2150   99.1 °F (37.3 °C) (!) 117 20 (!) 195/117 100 %      Temp Source Heart Rate Source Patient Position - Orthostatic VS BP Location FiO2 (%)   07/31/23 2150 07/31/23 2150 07/31/23 2150 07/31/23 2150 --   Oral Monitor Sitting Left arm       Pain Score       08/01/23 0357       No Pain          Wt Readings from Last 1 Encounters:   08/02/23 108 kg (237 lb)     Additional Vital Signs:   08/03/23 08:27:18 98 °F (36.7 °C) 76 16 127/87 100 96 % None (Room air) Lying   08/03/23 02:22:16 -- 72 -- 151/89 110 98 % -- --   08/02/23 2242 -- -- -- -- -- 97 % None (Room air) --   08/02/23 19:17:46 98.3 °F (36.8 °C) 85 -- 149/104 Abnormal  119 97 % -- --   08/02/23 1916 -- 99 -- 149/104 Abnormal  119 96 % -- --   08/02/23 15:16:22 98.4 °F (36.9 °C) 70 -- 142/91 108 97 % -- --   08/02/23 10:32:33 98.4 °F (36.9 °C) 112 Abnormal  20 132/94 107 95 % -- --     08/02/23 0840 -- 80 18 -- -- 99 % -- --   08/02/23 07:12:47 98.1 °F (36.7 °C) 81 18 142/91 108 97 % None (Room air) Lying   08/02/23 05:05:34 -- 77 -- 142/91 108 96 % -- --   08/02/23 02:19:54 -- 71 -- 145/90 108 97 % -- --   08/01/23 21:39:15 98.7 °F (37.1 °C) 78 -- 147/94 112 95 % -- --   08/01/23 18:35:40 99.3 °F (37.4 °C) 96 20 144/95 111 96 % -- --   08/01/23 15:25:01 99 °F (37.2 °C) 78 20 144/95 111 97 % -- --   08/01/23 11:14:38 -- 100 -- 136/83 101 96 % -- --   08/01/23 0915 -- 80 -- 135/82 -- -- -- --   08/01/23 07:18:59 98.2 °F (36.8 °C) 88 -- 135/82 100 97 % -- --   08/01/23 0400 -- -- -- -- -- -- None (Room air) --   08/01/23 03:41:13 99.2 °F (37.3 °C) 99 -- 158/95 116 96 % -- --   08/01/23 0015 -- 106 Abnormal  21 139/96 115 97 % None (Room air) Lying   07/31/23 2319 -- 113 Abnormal  22 168/108 Abnormal  -- 100 % None (Room air) Sitting     Pertinent Labs/Diagnostic Test Results:     7/31 ECG - ST    8/1 Echo -   Left Ventricle: Left ventricular cavity size is normal. Wall thickness is normal. The left ventricular ejection fraction is 55%. Systolic function is normal. Wall motion is normal. Diastolic function is normal.  •  Right Ventricle: Right ventricular cavity size is normal. Systolic function is normal.     8/2 ECG - Sinus rhythm with marked sinus arrhythmia  Otherwise normal ECG    8/2 Stress Echo -•  Left Ventricle: Left ventricular cavity size is normal. Wall thickness is normal. The left ventricular ejection fraction is 55%.  Systolic function is normal. Wall motion is normal.  •  Stress ECG: Overall, the patient's exercise capacity was normal for their age. The patient after exercising for 10 min and had a maximal HR of 196 bpm (100 % of MPHR) and 11.7 METS. The patient experienced no angina during the test.  •  Stress ECG: No ST deviation is noted. There were no arrhythmias during stress. Arrhythmias during recovery: rare PVCs. The stress ECG is negative for ischemia after maximal exercise, without reproduction of symptoms. •  Post Stress Echo: Left ventricle cavity has normal reduction in size post-stress. The left ventricle systolic function is normal post-stress. The post-stress echo showed normal wall motion. •  Echo Post Impression: The study is normal, after maximal exercise. XR chest portable   Final Result by Jaleel Barclay DO (08/01 1132)      No evidence of acute cardiopulmonary disease. MRI cardiac  w wo contrast      1. Normal left ventricular size and systolic function. 2. Normal right ventricular size and systolic function. No evidence of ARVD. 3. Normal bilateral atria. No valvular abnormalities.   4. No evidence of myocardial inflammation, infiltrative disease or scarring.            Results from last 7 days   Lab Units 08/03/23  0525 08/01/23  0500 07/31/23  2303   WBC Thousand/uL 10.14 10.05 11.12*   HEMOGLOBIN g/dL 17.2* 16.1 16.9   HEMATOCRIT % 50.5* 45.7 49.5*   PLATELETS Thousands/uL 325 315 340   NEUTROS ABS Thousands/µL  --  5.68 7.31         Results from last 7 days   Lab Units 08/03/23  0525 08/02/23  0518 08/01/23  0500 07/31/23  2303   SODIUM mmol/L 141 141 140 140   POTASSIUM mmol/L 3.5 4.2 3.4* 3.7   CHLORIDE mmol/L 106 109* 111* 108   CO2 mmol/L 30 28 24 27   ANION GAP mmol/L 5 4 5 5   BUN mg/dL 17 14 9 12   CREATININE mg/dL 0.92 0.94 0.75 0.89   EGFR ml/min/1.73sq m 115 112 127 118   CALCIUM mg/dL 9.9 10.0 9.0 9.9   MAGNESIUM mg/dL 2.4 2.4 2.6  --      Results from last 7 days   Lab Units 08/01/23  0500 07/31/23  2303   AST U/L 17 26   ALT U/L 50 61   ALK PHOS U/L 65 75   TOTAL PROTEIN g/dL 7.7 8.7*   ALBUMIN g/dL 3.8 4.2   TOTAL BILIRUBIN mg/dL 0.37 0.38         Results from last 7 days   Lab Units 08/03/23  0525 08/02/23  0518 08/01/23  0500 07/31/23  2303   GLUCOSE RANDOM mg/dL 89 97 90 104     Results from last 7 days   Lab Units 07/31/23  2303   HS TNI 0HR ng/L 3     Results from last 7 days   Lab Units 08/01/23  0033   D-DIMER QUANTITATIVE ug/ml FEU <0.27     Results from last 7 days   Lab Units 08/03/23  0525 08/01/23  0500   PROTIME seconds 14.6* 14.6*   INR  1.11 1.12   PTT seconds  --  32     Results from last 7 days   Lab Units 08/01/23  0500   TSH 3RD GENERATON uIU/mL 1.704   ED Treatment:   Medication Administration from 07/31/2023 2146 to 08/01/2023 4505       Date/Time Order Dose Route Action     08/01/2023 0011 EDT sodium chloride 0.9 % bolus 1,000 mL 1,000 mL Intravenous New Bag     08/01/2023 0323 EDT magnesium sulfate IVPB (premix) SOLN 1 g 1 g Intravenous New Bag          Admitting Diagnosis: Palpitations [R00.2]  Palpitation [R00.2]  Age/Sex: 20 y.o. male  Admission Orders:  Scheduled Medications:  vancomycin, 1,000 mg, Intravenous, Once      Continuous IV Infusions:     PRN Meds:  acetaminophen, 650 mg, Oral, Q6H PRN  levalbuterol, 1.25 mg, Nebulization, Q8H PRN    Tele  MRI Cardiac  NPO  IP CONSULT TO ELECTROPHYSIOLOGY    Network Utilization Review Department  ATTENTION: Please call with any questions or concerns to [phone] and carefully listen to the prompts so that you are directed to the right person. All voicemails are confidential.  Mackenzie Wyatt all requests for admission clinical reviews, approved or denied determinations and any other requests to dedicated fax number below belonging to the campus where the patient is receiving treatment.  List of dedicated fax numbers for the Facilities:  FACILITY NAME UR FAX NUMBER   ADMISSION DENIALS (Administrative/Medical Necessity) [phone]   [address] [place] [address] (Maternity/NICU/Pediatrics) [address] [phone]   [address] [phone]   [address] [facility] [address] [facility] [address] [address] [facility] [address] [place] [address] [address] [phone]

## 2023-08-02 NOTE — PROGRESS NOTES
[facility]  Progress Note  Name: Zetta Paget I  MRN: [id number]  Unit/Bed#: -01 I Date of Admission: 7/31/2023   Date of Service: 8/2/2023 I Hospital Day: 1    Assessment/Plan   * Palpitations  Assessment & Plan  Patient presented with palpitations causing him symptoms of lightheadedness and dizziness as well as SOB. EKG on his apple watch showed a wide complex tachycardia, ? VT. He was admitted for further evaluation and EP consult. · Holter in the past negative for arhythmia   · EP recommended cardiac MRI which is pending read  · Echo showed EF 55% without any structural abnormalities. · Stress echo negative   · Plan for ablation procedure tomorrow with EP, if VT cannot be induced during procedure then plan will be for ICD. · NPO after MN  · Continue telemetry, no events upon review  · Monitor electrolytes           VTE Pharmacologic Prophylaxis: VTE Score: 3 Moderate Risk (Score 3-4) - Pharmacological DVT Prophylaxis Contraindicated. Sequential Compression Devices Ordered. Discussions with Specialists or Other Care Team Provider: n/a    Education and Discussions with Family / Patient: Patient declined call to . Total Time Spent on Date of Encounter in care of patient: 25 minutes This time was spent on one or more of the following: performing physical exam; counseling and coordination of care; obtaining or reviewing history; documenting in the medical record; reviewing/ordering tests, medications or procedures; communicating with other healthcare professionals and discussing with patient's family/caregivers. Current Length of Stay: 1 day(s)  Current Patient Status: Inpatient   Certification Statement: The patient will continue to require additional inpatient hospital stay due to ablation tomorrow  Discharge Plan: Anticipate discharge in 48-72 hrs to home. Code Status: Level 1 - Full Code    Subjective:   No complaints.  No palpitations, dizziness or lightheadedness     Objective:     Vitals:   Temp (24hrs), Av.7 °F (37.1 °C), Min:98.1 °F (36.7 °C), Max:99.3 °F (37.4 °C)    Temp:  [98.1 °F (36.7 °C)-99.3 °F (37.4 °C)] 98.4 °F (36.9 °C)  HR:  [] 112  Resp:  [18-20] 20  BP: (132-147)/(90-95) 132/94  SpO2:  [95 %-99 %] 95 %  Body mass index is 35 kg/m². Input and Output Summary (last 24 hours): Intake/Output Summary (Last 24 hours) at 2023 1443  Last data filed at 2023 1326  Gross per 24 hour   Intake 466 ml   Output --   Net 466 ml       Physical Exam:   Physical Exam  Vitals and nursing note reviewed. Constitutional:       General: He is not in acute distress. Cardiovascular:      Rate and Rhythm: Normal rate. Heart sounds: No murmur heard. Pulmonary:      Effort: No respiratory distress. Neurological:      Mental Status: He is alert and oriented to person, place, and time. Mental status is at baseline.    Psychiatric:         Mood and Affect: Mood normal.          Additional Data:     Labs:  Results from last 7 days   Lab Units 23  0500   WBC Thousand/uL 10.05   HEMOGLOBIN g/dL 16.1   HEMATOCRIT % 45.7   PLATELETS Thousands/uL 315   NEUTROS PCT % 55   LYMPHS PCT % 33   MONOS PCT % 8   EOS PCT % 3     Results from last 7 days   Lab Units 23  0518 23  0500   SODIUM mmol/L 141 140   POTASSIUM mmol/L 4.2 3.4*   CHLORIDE mmol/L 109* 111*   CO2 mmol/L 28 24   BUN mg/dL 14 9   CREATININE mg/dL 0.94 0.75   ANION GAP mmol/L 4 5   CALCIUM mg/dL 10.0 9.0   ALBUMIN g/dL  --  3.8   TOTAL BILIRUBIN mg/dL  --  0.37   ALK PHOS U/L  --  65   ALT U/L  --  50   AST U/L  --  17   GLUCOSE RANDOM mg/dL 97 90     Results from last 7 days   Lab Units 23  0500   INR  1.12                   Lines/Drains:  Invasive Devices     Peripheral Intravenous Line  Duration           Peripheral IV 23 Right Antecubital 1 day                  Telemetry:  Telemetry Orders (From admission, onward) 24 Hour Telemetry Monitoring  Continuous x 24 Hours (Telem)        Question:  Reason for 24 Hour Telemetry  Answer:  Arrhythmias requiring acute medical intervention / PPM or ICD malfunction                 Telemetry Reviewed: Normal Sinus Rhythm  Indication for Continued Telemetry Use: Awaiting PCI/EP Study/CABG             Imaging: No pertinent imaging reviewed. Recent Cultures (last 7 days):         Last 24 Hours Medication List:   Current Facility-Administered Medications   Medication Dose Route Frequency Provider Last Rate   • acetaminophen  650 mg Oral Q6H PRN Perry Womack DO     • chlorhexidine  15 mL Swish & Spit Once Sung Pulliam     • levalbuterol  1.25 mg Nebulization Q8H PRN Perry Womack DO     • [START ON 8/3/2023] vancomycin  1,000 mg Intravenous Once JAMES Pulliam          Today, Patient Was Seen By: ABIMAEL Millan    **Please Note: This note may have been constructed using a voice recognition system. **

## 2023-08-02 NOTE — PLAN OF CARE
Problem: PAIN - ADULT  Goal: Verbalizes/displays adequate comfort level or baseline comfort level  Description: Interventions:  - Encourage patient to monitor pain and request assistance  - Assess pain using appropriate pain scale  - Administer analgesics based on type and severity of pain and evaluate response  - Implement non-pharmacological measures as appropriate and evaluate response  - Consider cultural and social influences on pain and pain management  - Notify physician/advanced practitioner if interventions unsuccessful or patient reports new pain  Outcome: Progressing     Problem: INFECTION - ADULT  Goal: Absence or prevention of progression during hospitalization  Description: INTERVENTIONS:  - Assess and monitor for signs and symptoms of infection  - Monitor lab/diagnostic results  - Monitor all insertion sites, i.e. indwelling lines, tubes, and drains  - Monitor endotracheal if appropriate and nasal secretions for changes in amount and color  - [place] appropriate cooling/warming therapies per order  - Administer medications as ordered  - Instruct and encourage patient and family to use good hand hygiene technique  - Identify and instruct in appropriate isolation precautions for identified infection/condition  Outcome: Progressing  Goal: Absence of fever/infection during neutropenic period  Description: INTERVENTIONS:  - Monitor WBC    Outcome: Progressing     Problem: SAFETY ADULT  Goal: Patient will remain free of falls  Description: INTERVENTIONS:  - Educate patient/family on patient safety including physical limitations  - Instruct patient to call for assistance with activity   - Consult OT/PT to assist with strengthening/mobility   - Keep Call bell within reach  - Keep bed low and locked with side rails adjusted as appropriate  - Keep care items and personal belongings within reach  - Initiate and maintain comfort rounds  - Make Fall Risk Sign visible to staff  - Offer Toileting every  Hours, in advance of need  - Initiate/Maintain alarm  - Obtain necessary fall risk management equipment:   - Apply yellow socks and bracelet for high fall risk patients  - Consider moving patient to room near nurses station  Outcome: Progressing  Goal: Maintain or return to baseline ADL function  Description: INTERVENTIONS:  -  Assess patient's ability to carry out ADLs; assess patient's baseline for ADL function and identify physical deficits which impact ability to perform ADLs (bathing, care of mouth/teeth, toileting, grooming, dressing, etc.)  - Assess/evaluate cause of self-care deficits   - Assess range of motion  - Assess patient's mobility; develop plan if impaired  - Assess patient's need for assistive devices and provide as appropriate  - Encourage maximum independence but intervene and supervise when necessary  - Involve family in performance of ADLs  - Assess for home care needs following discharge   - Consider OT consult to assist with ADL evaluation and planning for discharge  - Provide patient education as appropriate  Outcome: Progressing  Goal: Maintains/Returns to pre admission functional level  Description: INTERVENTIONS:  - Perform BMAT or MOVE assessment daily.   - Set and communicate daily mobility goal to care team and patient/family/caregiver. - Collaborate with rehabilitation services on mobility goals if consulted  - Perform Range of Motion  times a day. - Reposition patient every  hours.   - Dangle patient  times a day  - Stand patient  times a day  - Ambulate patient  times a day  - Out of bed to chair  times a day   - Out of bed for meals times a day  - Out of bed for toileting  - Record patient progress and toleration of activity level   Outcome: Progressing     Problem: DISCHARGE PLANNING  Goal: Discharge to home or other facility with appropriate resources  Description: INTERVENTIONS:  - Identify barriers to discharge w/patient and caregiver  - Arrange for needed discharge resources and transportation as appropriate  - Identify discharge learning needs (meds, wound care, etc.)  - Arrange for interpretive services to assist at discharge as needed  - Refer to Case Management Department for coordinating discharge planning if the patient needs post-hospital services based on physician/advanced practitioner order or complex needs related to functional status, cognitive ability, or social support system  Outcome: Progressing     Problem: Knowledge Deficit  Goal: Patient/family/caregiver demonstrates understanding of disease process, treatment plan, medications, and discharge instructions  Description: Complete learning assessment and assess knowledge base.   Interventions:  - Provide teaching at level of understanding  - Provide teaching via preferred learning methods  Outcome: Progressing

## 2023-08-03 VITALS
SYSTOLIC BLOOD PRESSURE: 134 MMHG | TEMPERATURE: 98 F | HEIGHT: 69 IN | RESPIRATION RATE: 18 BRPM | BODY MASS INDEX: 35.1 KG/M2 | OXYGEN SATURATION: 98 % | WEIGHT: 237 LBS | HEART RATE: 95 BPM | DIASTOLIC BLOOD PRESSURE: 91 MMHG

## 2023-08-03 LAB
ANION GAP SERPL CALCULATED.3IONS-SCNC: 5 MMOL/L
ATRIAL RATE: 68 BPM
BUN SERPL-MCNC: 17 MG/DL (ref 5–25)
CALCIUM SERPL-MCNC: 9.9 MG/DL (ref 8.3–10.1)
CHLORIDE SERPL-SCNC: 106 MMOL/L (ref 96–108)
CO2 SERPL-SCNC: 30 MMOL/L (ref 21–32)
CREAT SERPL-MCNC: 0.92 MG/DL (ref 0.6–1.3)
ERYTHROCYTE [DISTWIDTH] IN BLOOD BY AUTOMATED COUNT: 12 % (ref 11.6–15.1)
GFR SERPL CREATININE-BSD FRML MDRD: 115 ML/MIN/1.73SQ M
GLUCOSE SERPL-MCNC: 147 MG/DL (ref 65–140)
GLUCOSE SERPL-MCNC: 89 MG/DL (ref 65–140)
HCT VFR BLD AUTO: 50.5 % (ref 36.5–49.3)
HGB BLD-MCNC: 17.2 G/DL (ref 12–17)
INR PPP: 1.11 (ref 0.84–1.19)
MAGNESIUM SERPL-MCNC: 2.4 MG/DL (ref 1.6–2.6)
MCH RBC QN AUTO: 29.8 PG (ref 26.8–34.3)
MCHC RBC AUTO-ENTMCNC: 34.1 G/DL (ref 31.4–37.4)
MCV RBC AUTO: 87 FL (ref 82–98)
P AXIS: 32 DEGREES
PLATELET # BLD AUTO: 325 THOUSANDS/UL (ref 149–390)
PMV BLD AUTO: 9.4 FL (ref 8.9–12.7)
POTASSIUM SERPL-SCNC: 3.5 MMOL/L (ref 3.5–5.3)
PR INTERVAL: 166 MS
PROTHROMBIN TIME: 14.6 SECONDS (ref 11.6–14.5)
QRS AXIS: 22 DEGREES
QRSD INTERVAL: 96 MS
QT INTERVAL: 388 MS
QTC INTERVAL: 412 MS
RBC # BLD AUTO: 5.78 MILLION/UL (ref 3.88–5.62)
SODIUM SERPL-SCNC: 141 MMOL/L (ref 135–147)
T WAVE AXIS: -1 DEGREES
VENTRICULAR RATE: 68 BPM
WBC # BLD AUTO: 10.14 THOUSAND/UL (ref 4.31–10.16)

## 2023-08-03 PROCEDURE — 93010 ELECTROCARDIOGRAM REPORT: CPT | Performed by: INTERNAL MEDICINE

## 2023-08-03 PROCEDURE — 83735 ASSAY OF MAGNESIUM: CPT | Performed by: PHYSICIAN ASSISTANT

## 2023-08-03 PROCEDURE — 0JH632Z INSERTION OF MONITORING DEVICE INTO CHEST SUBCUTANEOUS TISSUE AND FASCIA, PERCUTANEOUS APPROACH: ICD-10-PCS | Performed by: INTERNAL MEDICINE

## 2023-08-03 PROCEDURE — 93005 ELECTROCARDIOGRAM TRACING: CPT

## 2023-08-03 PROCEDURE — 99232 SBSQ HOSP IP/OBS MODERATE 35: CPT | Performed by: PHYSICIAN ASSISTANT

## 2023-08-03 PROCEDURE — NC001 PR NO CHARGE: Performed by: INTERNAL MEDICINE

## 2023-08-03 PROCEDURE — 82948 REAGENT STRIP/BLOOD GLUCOSE: CPT

## 2023-08-03 PROCEDURE — 33285 INSJ SUBQ CAR RHYTHM MNTR: CPT | Performed by: INTERNAL MEDICINE

## 2023-08-03 PROCEDURE — 85027 COMPLETE CBC AUTOMATED: CPT | Performed by: PHYSICIAN ASSISTANT

## 2023-08-03 PROCEDURE — C1764 EVENT RECORDER, CARDIAC: HCPCS | Performed by: INTERNAL MEDICINE

## 2023-08-03 PROCEDURE — 85610 PROTHROMBIN TIME: CPT | Performed by: PHYSICIAN ASSISTANT

## 2023-08-03 PROCEDURE — 80048 BASIC METABOLIC PNL TOTAL CA: CPT | Performed by: PHYSICIAN ASSISTANT

## 2023-08-03 PROCEDURE — 94664 DEMO&/EVAL PT USE INHALER: CPT

## 2023-08-03 DEVICE — LOOP RECORDER REVEAL LINQ II SYS DEVICE ONLY: Type: IMPLANTABLE DEVICE | Site: CHEST  WALL | Status: FUNCTIONAL

## 2023-08-03 RX ORDER — LIDOCAINE HYDROCHLORIDE AND EPINEPHRINE 10; 10 MG/ML; UG/ML
INJECTION, SOLUTION INFILTRATION; PERINEURAL CODE/TRAUMA/SEDATION MEDICATION
Status: DISCONTINUED | OUTPATIENT
Start: 2023-08-03 | End: 2023-08-03 | Stop reason: HOSPADM

## 2023-08-03 NOTE — ASSESSMENT & PLAN NOTE
· Patient presented with palpitations causing him symptoms of lightheadedness and dizziness as well as SOB. EKG on his apple watch showed a wide complex tachycardia, ? VT. He was admitted for further evaluation and EP consult. · Holter in the past negative for arhythmia   · Cardiac MRI revealed normal bilateral ventricular sizes, no evidence of ARVD, normal bilateral atria, no valvular abnormalities, and no evidence of myocardial inflammation/infiltrative disease/scarring  · Echo showed EF 55% without any structural abnormalities. · Stress echo negative   · Plan for ablation procedure today with EP, if VT cannot be induced during procedure then plan will be for ICD.    · Patient's mother is requesting to speak with EP prior - made them aware  · NPO   · Continue telemetry, no events upon review  · Monitor electrolytes PRN

## 2023-08-03 NOTE — UTILIZATION REVIEW
NOTIFICATION OF INPATIENT ADMISSION   AUTHORIZATION REQUEST   SERVICING FACILITY:   [address] [place] [address]  Address: [address] [place] [address]  Tax ID: [id number]  NPI: [id number] ATTENDING PROVIDER:  Attending Name and NPI#: Ashley Gregory [[id number]]  Address: [address] [place] [address]  Phone: [phone]   ADMISSION INFORMATION:  Place of Service: Inpatient [facility]  Place of Service Code: 21  Inpatient Admission Date/Time: 8/1/23 12:31 PM  Discharge Date/Time: No discharge date for patient encounter. Admitting Diagnosis Code/Description:  Palpitations [R00.2]  Palpitation [R00.2]     UTILIZATION REVIEW CONTACT:  Megan Farley Utilization   Network Utilization Review Department  Phone: [phone]  Fax: [phone]  Email: [email or web address]  Contact for approvals/pending authorizations, clinical reviews, and discharge. PHYSICIAN ADVISORY SERVICES:  Medical Necessity Denial & Wzxs-bn-Uyrg Review  Phone: [phone]  Fax: [phone]  Email: [email or web address]

## 2023-08-03 NOTE — DISCHARGE INSTR - AVS FIRST PAGE
Do not use lotions/powders/creams on incision. Remove outer bandage 48 hours after procedure - if present, leave suture in place and they will be removed at 2 week follow up appointment. Please keep incision dry for the first first week - either keeping incision out of direct shower water or placing over the counter waterproof bandages over top when showering. Please call the office [phone] if you notice redness, swelling, bleeding, or drainage from incision or if you develop fevers. Cardiac Loop Recorder Insertion      WHAT YOU SHOULD KNOW:    A cardiac loop recorder is a device used to diagnose heart rhythm problems, such as a fast or irregular heartbeat. It is implanted in your left chest, just under the skin. The device records a pattern of your heart's rhythm, called an EKG. Your device records automatic EKGs, depending on how your caregiver programs it. You may also receive a handheld controller. You press a button on the controller when you have symptoms, such as dizziness, lightheadedness, or palpitations. The device will record an EKG at that moment. The recording can help your caregiver see if your symptoms may be caused by heart rhythm problems. Your caregiver will remove the device after it has collected enough data. You may need the device for up to 3 years. The procedure to remove the device is similar to the procedure used to implant it. AFTER YOU LEAVE:    Follow up with your cardiologist as directed: You will need to return in 1 to 2 weeks. Your cardiologist will check your incision. He may also program your device settings again. He will retrieve data from the device every 1 to 3 months with a monitor held over your skin. You may be able to transmit data from your device from home as well. You will do this by calling a number provided by your cardiologist, or as they have instructed you. Ask for information about this process.  Write down your questions so you remember to ask them during your visits. Wound care: Keep loop recorder incision dry for one week. Do not use lotions/powders/creams on incision. Remove outer bandage 48 hours after procedure - leave underlying steri-strips in place, they will either fall off on their own or will be removed at 2 week follow up appointment. Please call the office if you notice redness, swelling, bleeding, or drainage from incision or if you develop fevers. After that first week, carefully wash your incision with soap and water. Keep the area clean and dry until it heals. Return to activity: If you received anesthesia, you will not be able to drive for 24 hours. Otherwise, most people can return to normal activities soon after the procedure. Your cardiologist may want to know if your work involves electrical current or high-voltage equipment. Ask about other electrical items that could interfere with your cardiac loop recorder. Contact your cardiologist if:   You have a fever or chills. Your wound is red, swollen, or draining pus. You have questions or concerns about your condition or care. Seek care immediately or call 911 if: You feel weak, dizzy, or faint. You lose consciousness. © 2014 [address] [facility] is for End User's use only and may not be sold, redistributed or otherwise used for commercial purposes. All illustrations and images included in CareNotes® are the copyrighted property of A.D.A.M., Inc. or Olivier Ronquillo. The above information is an  only. It is not intended as medical advice for individual conditions or treatments. Talk to your doctor, nurse or pharmacist before following any medical regimen to see if it is safe and effective for you.

## 2023-08-03 NOTE — CASE MANAGEMENT
Case Management Discharge Planning Note    Patient name Vania Miles [facility]  Location /-58 MRN [id number]  : 2000 Date 8/3/2023       Current Admission Date: 2023  Current Admission Diagnosis:Palpitations   Patient Active Problem List    Diagnosis Date Noted   • Palpitations 2023   • Mild intermittent reactive airway disease without complication 10/20/4977      LOS (days): 2  Geometric Mean LOS (GMLOS) (days): 1.80  Days to GMLOS:-0.1     OBJECTIVE:  Risk of Unplanned Readmission Score: 6.65         Current admission status: Inpatient   [facility]erred Pharmacy:   [id number]  [address]  Phone: [phone] Fax: [phone]    Primary Care Provider: Cassandra Whitney DO    Primary Insurance: BLUE CROSS  Secondary Insurance:     DISCHARGE DETAILS:  Per [facility] provider - awaiting EP recs, anticipate dc today vs tomorrow. Likely no CM needs. Will continue to support.

## 2023-08-03 NOTE — ASSESSMENT & PLAN NOTE
· Patient presented with palpitations causing him symptoms of lightheadedness and dizziness as well as SOB. EKG on his apple watch showed a wide complex tachycardia, ? VT. He was admitted for further evaluation and EP consult. · Holter in the past negative for arhythmia   · Cardiac MRI revealed normal bilateral ventricular sizes, no evidence of ARVD, normal bilateral atria, no valvular abnormalities, and no evidence of myocardial inflammation/infiltrative disease/scarring  · Echo showed EF 55% without any structural abnormalities.    · Stress echo negative   · EP performed Loop implant today  · EP cleared the patient from their standpoint for discharge today

## 2023-08-03 NOTE — PROGRESS NOTES
Electrophysiology (EP) Progress Note - Carmen Gonzalez 20 y.o. male MRN: [id number]    Unit/Bed#: BE CATH LAB ROOM Encounter: [id number]      Subjective:   Patient seen and examined at bedside. He was in normal sinus rhythm with intermittent periods of bradycardia in the 50s overnight. Denies lightheadedness, dizziness, syncope, headache, vision changes, diaphoresis, chest pain, palpitations, shortness of breath, PND, orthopnea, nausea, vomiting, abdominal pain or lower extremity edema. Hospital Course:   Soco Skinner is a 20y.o. year old male with no significant PMH who presented to the hospital with dizziness and palpitations at rest. There was a 10 minute episode of this when he was siting on his sofa resting and began with a coughing episode. He checked his rhythm on his apple watch and it showed a wide complex tachycardia. Vitals: Blood pressure 128/91, pulse 76, temperature 98.2 °F (36.8 °C), resp. rate 16, height 5' 9" (1.753 m), weight 108 kg (237 lb), SpO2 97 %. , Body mass index is 35 kg/m².,   Orthostatic Blood Pressures    Flowsheet Row Most Recent Value   Blood Pressure 128/91 filed at 08/03/2023 1134   Patient Position - Orthostatic VS Lying filed at 08/03/2023 0827            Intake/Output Summary (Last 24 hours) at 8/3/2023 1345  Last data filed at 8/3/2023 1134  Gross per 24 hour   Intake 0 ml   Output --   Net 0 ml       Review of System:  Review of system was conducted and was negative except for as stated in the subjective course.     Physical Exam:    GEN: Soco Skinner appears well, alert and oriented x 3, pleasant and cooperative   HEENT:  Normocephalic, atraumatic, anicteric, moist mucous membranes  NECK: No JVD or carotid bruits   HEART: normal sinus rhythm, normal rate, normal S1 and S2, no murmurs, clicks, gallops or rubs   LUNGS: Clear to auscultation bilaterally; no wheezes, rales, or rhonchi; respiration nonlabored   ABDOMEN:  Normoactive bowel sounds, soft, no tenderness, no distention  EXTREMITIES: peripheral pulses palpable; no edema  NEURO: no gross focal findings; cranial nerves grossly intact   SKIN:  Dry, intact, warm to touch      Current Facility-Administered Medications:   •  acetaminophen (TYLENOL) tablet 650 mg, 650 mg, Oral, Q6H PRN, Fontaine Pain, DO  •  levalbuterol (XOPENEX) inhalation solution 1.25 mg, 1.25 mg, Nebulization, Q8H PRN, Fontaine Pain, DO  •  vancomycin (VANCOCIN) IVPB (premix in dextrose) 1,000 mg 200 mL, 1,000 mg, Intravenous, Once, Leland Benoit PA-C    Labs & Results:  Results from last 7 days   Lab Units 07/31/23  2303   HS TNI 0HR ng/L 3         Results from last 7 days   Lab Units 08/03/23  0525 08/02/23  0518 08/01/23  0500   POTASSIUM mmol/L 3.5 4.2 3.4*   CO2 mmol/L 30 28 24   CHLORIDE mmol/L 106 109* 111*   BUN mg/dL 17 14 9   CREATININE mg/dL 0.92 0.94 0.75     Results from last 7 days   Lab Units 08/03/23  0525 08/01/23  0500 07/31/23  2303   HEMOGLOBIN g/dL 17.2* 16.1 16.9   HEMATOCRIT % 50.5* 45.7 49.5*   PLATELETS Thousands/uL 325 315 340           Telemetry:   Personally reviewed by Sanjuana Ramsey DO: sinus rhythm with periods of sinus bradycardia. Assessment:  Principal Problem:    Palpitations    1. Wide complex tachycardia  It is difficult to determine what rhythm Jermain Must has been having because we have not been able to capture any rhythms while he has been in the hospital it is unlikely that he had Vfib given that the episode lasted 10 minutes and the only symptoms he had was lightheadedness and palpitations. V fib would have a more malignant presentation. An EP study can be doesnt to assess for accessory pathways and to determine if VF can be induced him however even if it can be induced in the EP lab it does not mean that he will have this arrhythmia.  If VF is induced and a defibrillator is placed, there is a high chance of a complication from the defibrillator over the rest of his lifetime including inappropriate shock, bleeding, infection, need for lead replacement. The third option is to place a loop recorder to evaluate his rhythm and symptoms over a long period of time and get a more accurate rhythm strip during his symptoms. These options were discussed with the patient and his mother at bedside. Plan:  - after patient education and discussion the patient has decided to have a loop recorder placed for monitoring at this time without invasive studies. - plan for loop recorder placement today. After loop recorder is placed he can be discharged and follow up with EP in outpatient setting. Case discussed and reviewed with Dr. Marina Penn who agrees with my assessment and plan. Thank you for involving us in the care of your patient. Julius Saenz,   Cardiology Fellow   PGY-4      ** Please Note: Fluency DirectDictation voice to text software may have been used in the creation of this document.  **

## 2023-08-03 NOTE — PROGRESS NOTES
[address] [facility]  Progress Note  Name: Marry Rosenthal I  MRN: [id number]  Unit/Bed#: -01 I Date of Admission: 7/31/2023   Date of Service: 8/3/2023 I Hospital Day: 2    Assessment/Plan   * Palpitations  Assessment & Plan  · Patient presented with palpitations causing him symptoms of lightheadedness and dizziness as well as SOB. EKG on his apple watch showed a wide complex tachycardia, ? VT. He was admitted for further evaluation and EP consult. · Holter in the past negative for arhythmia   · Cardiac MRI revealed normal bilateral ventricular sizes, no evidence of ARVD, normal bilateral atria, no valvular abnormalities, and no evidence of myocardial inflammation/infiltrative disease/scarring  · Echo showed EF 55% without any structural abnormalities. · Stress echo negative   · EP performed Loop implant today  · EP cleared the patient from their standpoint for discharge today         Medical Problems     Resolved Problems  Date Reviewed: 8/3/2023   None       Discharging Physician / Practitioner: Rock Altamirano PA-C  PCP: Mindy Fontenot DO  Admission Date:   Admission Orders (From admission, onward)     Ordered        08/01/23 1231  Inpatient Admission  Once            08/01/23 0147  Place in Observation  Once                      Discharge Date: 08/03/23    Consultations During Hospital Stay:  · EP    Procedures Performed:   · CXR  · Cardiac MRI  · Echo  · Stress test  · Loop recorder implant   · CBC  · CMP  · HS troponin  · D-dimer  · TSH    Significant Findings / Test Results:   · CXR: "No evidence of acute cardiopulmonary disease."  · Cardiac MRI: "1. Normal left ventricular size and systolic function. 2. Normal right ventricular size and systolic function. No evidence of ARVD. 3. Normal bilateral atria. No valvular abnormalities.  4. No evidence of myocardial inflammation, infiltrative disease or scarring."  · Echo: LVEF 55%, wall motion is normal. Diastolic function is normal  · Stress test: "No electrocardiographic or echocardiographic evidence of inducible ischemia after maximal exercise."  · Loop recorder implant: "The patient came in to the laboratory for linq device placement. The device has been placed and patient tolerated the procedure well"  · HS troponin: 3  · D-dimer: <0.27  · TSH: 1.704    Incidental Findings:   · None     Test Results Pending at Discharge (will require follow up): · None     Outpatient Tests Requested:  · Follow up with EP and PCP    Complications:  None    Reason for Admission: palpitations     Hospital Course:   Allie Benavides is a 20 y.o. male with asthma who originally presented to the hospital on 7/31/2023 due to palpitations and dizziness. Apple watch showed a wide complex tachycardia. EP was consulted. He underwent echo, stress test, and cardiac MRI as above. EP placed loop recorder today. EP cleared the patient from their standpoint for discharge today. Follow up with EP and PCP. Please see above list of diagnoses and related plan for additional information. Condition at Discharge: stable    Discharge Day Visit / Exam:   * Please refer to separate progress note for these details *    Discussion with Family: Updated  (mother) at bedside. Discharge instructions/Information to patient and family:   See after visit summary for information provided to patient and family. Provisions for Follow-Up Care:  See after visit summary for information related to follow-up care and any pertinent home health orders. Disposition:   Home    Planned Readmission: None     Discharge Statement:  I spent 36 minutes discharging the patient. This time was spent on the day of discharge. I had direct contact with the patient on the day of discharge.  Greater than 50% of the total time was spent examining patient, answering all patient questions, arranging and discussing plan of care with patient as well as directly providing post-discharge instructions. Additional time then spent on discharge activities. Discharge Medications:  See after visit summary for reconciled discharge medications provided to patient and/or family.       **Please Note: This note may have been constructed using a voice recognition system**

## 2023-08-03 NOTE — PROGRESS NOTES
The patient was seen, examined and evaluated  I have reviewed the patient's EKG, telemetry, imaging myself      Dr Marcos Giordano and myself discussed in detail with patient and his mothert        Subjective  The patient is currently not in any acute distress        Objective  BMI - 35  Heart sounds audible S1-S2  Bilateral air entry is present  General:   no acute distress  Eyes:  Sclera anicteric. Conjunctiva pink  ENT: posterior pharynx is crowded,  Mucous membranes without erythema or exudate  Neck:  Supple without JVD or thyromegaly carotids +2 without bruits  Abdomen:  Nontender without mass organomegaly  Extremities:   edema. EKG and telemetry  Reviewed by myself  Sinus rhythm         Cardiac MRI  August 3, 2023  Findings:  1. Normal left ventricular size and systolic function. No regional wall motion abnormalities. Normal LV wall thickness. 2. Normal right ventricular size and systolic function. No regional wall motion abnormalities. 3. The aortic, mitral, and tricuspid valves open without restriction. There is no significant valvular regurgitation, however cine MRI is inaccurate in the qualitative assessment of valvular regurgitation. 4. The left atrium is normal in size. The aortic root is normal in size. 5. There is no evidence of fibrofatty infiltration into the right ventricular myocardium. 6. Delayed post-gadolinium imaging demonstrates no region of hyperenhancement, noting that the most basal portion of the left ventricular myocardium is not included on axial images, but is normal on radial images.     IMPRESSION:  Impression:  1. Normal left ventricular size and systolic function. 2. Normal right ventricular size and systolic function. No evidence of ARVD. 3. Normal bilateral atria. No valvular abnormalities. 4. No evidence of myocardial inflammation, infiltrative disease or scarring.             Echo stress test   August 2, 2023  •  Left Ventricle: Left ventricular cavity size is normal. Wall thickness is normal. The left ventricular ejection fraction is 55%. Systolic function is normal. Wall motion is normal.  •  Stress ECG: Overall, the patient's exercise capacity was normal for their age. The patient after exercising for 10 min and had a maximal HR of 196 bpm (100 % of MPHR) and 11.7 METS. The patient experienced no angina during the test.  •  Stress ECG: No ST deviation is noted. There were no arrhythmias during stress. Arrhythmias during recovery: rare PVCs. The stress ECG is negative for ischemia after maximal exercise, without reproduction of symptoms. •  Post Stress Echo: Left ventricle cavity has normal reduction in size post-stress. The left ventricle systolic function is normal post-stress. The post-stress echo showed normal wall motion. •  Echo Post Impression: The study is normal, after maximal exercise.           EKG during echo stress at peak Heart rate   Narrow QRS, same as at lower rate , no antegrade pathway conduction expected            Echo  August 1, 2023  •  Left Ventricle: Left ventricular cavity size is normal. Wall thickness is normal. The left ventricular ejection fraction is 55%.  Systolic function is normal. Wall motion is normal. Diastolic function is normal.  •  Right Ventricle: Right ventricular cavity size is normal. Systolic function is normal.            Holter, 2022            Strips which were suspected to be tachycardia - palpitation for 10 min - some lightheadedness / NO  SYNCOPE            Impression with assessment and plan    Palpitations   Family histiory of CMP- father has ICD  Asthma   GERD   BMI 28        Dr Ammy Evans and myself had a detailed discussion with patient     - patient had palpitations for >10 min but no syncope - the rhythm strip from Apple watch is suggestive of VF / or artifact - It is extremely unlikely that a patient with that rhythm for 10 min will not have syncope or cardiac arrest     - The patient does have   Normal EKG  Normal echo  Normal cardiac MRI  Normal Holter   Normal stress test  - No evidence of pathway with antegrade conduction during peak heart rate 196/min    His only major risk is his father has dilated CMP and an ICD     - It will be better to have an idea of tachycardia before we treat  it     Existing Apple phone strip is more likely to be artifact - unlikely to have VF for 10 min and have no syncope    Very low likelihood of pathway post stress test with  and no evidence of anterograde conduction by pathway     VF during EP study is not indicator for ICD except in known syndrome like Brugada /  with characteristic clinical presentation - which our patient does not have       - recommend loop implant and follow symptom rhythm correlation     - Dr Corinne Mire and Dr Tiffanie Carrero were both informed and had discussion about patient - All 3 EP agree with current plan of loop monitoring for the patient

## 2023-08-03 NOTE — PROGRESS NOTES
All discharge instructions reviewed with patient. Heplock removed. Masimo removed. Telemetry removed. No scripts to give. Pt discharged to home.

## 2023-08-03 NOTE — APP STUDENT NOTE
Palpitations  Assessment & Plan  Patient presented with palpitations causing him symptoms of lightheadedness and dizziness as well as SOB. EKG on his apple watch showed a wide complex tachycardia, ? VT. He was admitted for further evaluation and EP consult. • Holter in the past negative for arhythmia   • EP recommended cardiac MRI which is pending read  • Echo showed EF 55% without any structural abnormalities. • Stress echo negative   • Plan for ablation procedure tomorrow with EP, if VT cannot be induced during procedure then plan will be for ICD. ? NPO after MN  • Cardiac MRI shows normal b/l ventricular size and systolic function. Normal b/l atria w/ no valvular abnormalities. No evidence of myocardial inflammation, infiltrative disease or scarring. • Patient is scheduled for ablation at noon. Continue telemetry as patient remains asx. Continue monitoring electrolytes. VTE Pharmacologic Prophylaxis: VTE Score: 3 Moderate Risk (Score 3-4) - Pharmacological DVT Prophylaxis Contraindicated. Sequential Compression Devices Ordered. Current Length of Stay: 2 day(s)  Current Patient Status: Inpatient   Certification Statement: The patient will continue to require additional inpatient hospital stay due to ablation scheduled at 12 pm 8/3/23  Discharge Plan: Anticipate discharge in 48-72 hrs to home. Code Status: Level 1 - Full Code    Subjective:   Patient reports intermittent nausea and lightheadedness/dizziness last night prior to bed that subsided this morning. Patient also reports occasional coughing but associates this with his pmhx of asthma. Patient reports he is anxious about the procedure today. Patient denies any feeling of palpitations last night or today. Patient denies chest pain, SOB, fevers, chills, abdominal pain, constipation, diarrhea, headaches, any new sx overnight or today.      Objective:     Vitals:   Temp (24hrs), Av.3 °F (36.8 °C), Min:98 °F (36.7 °C), Max:98.4 °F (36.9 °C)    Temp:  [98 °F (36.7 °C)-98.4 °F (36.9 °C)] 98 °F (36.7 °C)  HR:  [] 76  Resp:  [16-20] 16  BP: (127-151)/() 127/87  SpO2:  [95 %-98 %] 96 %  Body mass index is 35 kg/m². Input and Output Summary (last 24 hours): Intake/Output Summary (Last 24 hours) at 8/3/2023 0906  Last data filed at 8/2/2023 1326  Gross per 24 hour   Intake 226 ml   Output --   Net 226 ml       Physical Exam:   Physical Exam  Constitutional:       General: He is not in acute distress. Appearance: Normal appearance. He is not ill-appearing or diaphoretic. HENT:      Head: Normocephalic and atraumatic. Right Ear: External ear normal.      Left Ear: External ear normal.      Nose: Nose normal.   Eyes:      Extraocular Movements: Extraocular movements intact. Conjunctiva/sclera: Conjunctivae normal.      Pupils: Pupils are equal, round, and reactive to light. Neck:      Vascular: No carotid bruit. Comments: No JVD  Cardiovascular:      Rate and Rhythm: Normal rate and regular rhythm. Pulses: Normal pulses. Heart sounds: Normal heart sounds. No murmur heard. No friction rub. No gallop. Pulmonary:      Effort: Pulmonary effort is normal.      Breath sounds: Normal breath sounds. No wheezing, rhonchi or rales. Abdominal:      General: Abdomen is flat. Bowel sounds are normal.      Palpations: Abdomen is soft. Musculoskeletal:         General: No swelling. Cervical back: Normal range of motion and neck supple. Right lower leg: No edema. Left lower leg: No edema. Skin:     General: Skin is warm and dry. Capillary Refill: Capillary refill takes less than 2 seconds. Coloration: Skin is not jaundiced or pale. Findings: No bruising or rash. Neurological:      General: No focal deficit present. Mental Status: He is alert and oriented to person, place, and time. Motor: No weakness.    Psychiatric:         Mood and Affect: Mood normal. Behavior: Behavior normal.          Additional Data:     Labs:  Results from last 7 days   Lab Units 08/03/23  0525 08/01/23  0500   WBC Thousand/uL 10.14 10.05   HEMOGLOBIN g/dL 17.2* 16.1   HEMATOCRIT % 50.5* 45.7   PLATELETS Thousands/uL 325 315   NEUTROS PCT %  --  55   LYMPHS PCT %  --  33   MONOS PCT %  --  8   EOS PCT %  --  3     Results from last 7 days   Lab Units 08/03/23  0525 08/02/23  0518 08/01/23  0500   SODIUM mmol/L 141   < > 140   POTASSIUM mmol/L 3.5   < > 3.4*   CHLORIDE mmol/L 106   < > 111*   CO2 mmol/L 30   < > 24   BUN mg/dL 17   < > 9   CREATININE mg/dL 0.92   < > 0.75   ANION GAP mmol/L 5   < > 5   CALCIUM mg/dL 9.9   < > 9.0   ALBUMIN g/dL  --   --  3.8   TOTAL BILIRUBIN mg/dL  --   --  0.37   ALK PHOS U/L  --   --  65   ALT U/L  --   --  50   AST U/L  --   --  17   GLUCOSE RANDOM mg/dL 89   < > 90    < > = values in this interval not displayed.      Results from last 7 days   Lab Units 08/03/23  0525   INR  1.11                   Lines/Drains:  Invasive Devices     Peripheral Intravenous Line  Duration           Peripheral IV 08/01/23 Right Antecubital 2 days                  Telemetry:  Telemetry Orders (From admission, onward)             24 Hour Telemetry Monitoring  Continuous x 24 Hours (Telem)        Expiring   Question:  Reason for 24 Hour Telemetry  Answer:  Arrhythmias requiring acute medical intervention / PPM or ICD malfunction                 Telemetry Reviewed: Normal Sinus Rhythm             Imaging: Reviewed radiology reports from this admission including: chest xray and ECHO    Recent Cultures (last 7 days):         Last 24 Hours Medication List:   Current Facility-Administered Medications   Medication Dose Route Frequency Provider Last Rate   • acetaminophen  650 mg Oral Q6H PRN Mara Rina, DO     • levalbuterol  1.25 mg Nebulization Q8H PRN Mara Rina, DO     • vancomycin  1,000 mg Intravenous Once Petrona Franz PA-C          Today, Patient Was Seen By: Emmanuel Bridges    **Please Note: This note may have been constructed using a voice recognition system. **

## 2023-08-03 NOTE — PROGRESS NOTES
[address] [facility]  Progress Note  Name: Lokesh Quintero I  MRN: [id number]  Unit/Bed#: -01 I Date of Admission: 7/31/2023   Date of Service: 8/3/2023 I Hospital Day: 2    Assessment/Plan   * Palpitations  Assessment & Plan  · Patient presented with palpitations causing him symptoms of lightheadedness and dizziness as well as SOB. EKG on his apple watch showed a wide complex tachycardia, ? VT. He was admitted for further evaluation and EP consult. · Holter in the past negative for arhythmia   · Cardiac MRI revealed normal bilateral ventricular sizes, no evidence of ARVD, normal bilateral atria, no valvular abnormalities, and no evidence of myocardial inflammation/infiltrative disease/scarring  · Echo showed EF 55% without any structural abnormalities. · Stress echo negative   · Plan for ablation procedure today with EP, if VT cannot be induced during procedure then plan will be for ICD. · Patient's mother is requesting to speak with EP prior - made them aware  · NPO   · Continue telemetry, no events upon review  · Monitor electrolytes PRN           VTE Pharmacologic Prophylaxis: VTE Score: 3 Moderate Risk (Score 3-4) - Pharmacological DVT Prophylaxis Contraindicated. Sequential Compression Devices Ordered. EP procedure today    Patient Centered Rounds: I performed bedside rounds with nursing staff today. d/w ANA Dumont  Discussions with Specialists or Other Care Team Provider: TT'brian EP AP    Education and Discussions with Family / Patient: Updated  (mother) at bedside.     Total Time Spent on Date of Encounter in care of patient: 25 minutes This time was spent on one or more of the following: performing physical exam; counseling and coordination of care; obtaining or reviewing history; documenting in the medical record; reviewing/ordering tests, medications or procedures; communicating with other healthcare professionals and discussing with patient's family/caregivers. Current Length of Stay: 2 day(s)  Current Patient Status: Inpatient   Certification Statement: The patient will continue to require additional inpatient hospital stay due to EP procedure  Discharge Plan: pending EP procedure, further recs and clearance    Code Status: Level 1 - Full Code    Subjective:   Mr. Ignacia Aranda reports an episode of dizziness last night. Denies CP, SOB, palpitations. His mother requests to speak with EP prior to procedure - I made them aware via TT    Objective:     Vitals:   Temp (24hrs), Av.3 °F (36.8 °C), Min:98 °F (36.7 °C), Max:98.4 °F (36.9 °C)    Temp:  [98 °F (36.7 °C)-98.4 °F (36.9 °C)] 98 °F (36.7 °C)  HR:  [] 76  Resp:  [16-20] 16  BP: (127-151)/() 127/87  SpO2:  [95 %-98 %] 96 %  Body mass index is 35 kg/m². Input and Output Summary (last 24 hours): Intake/Output Summary (Last 24 hours) at 8/3/2023 9869  Last data filed at 2023 1326  Gross per 24 hour   Intake 226 ml   Output --   Net 226 ml       Physical Exam:   Physical Exam  Vitals reviewed. Constitutional:       Comments: Patient seen sitting in bed, NAD   Cardiovascular:      Rate and Rhythm: Normal rate and regular rhythm. Pulmonary:      Effort: Pulmonary effort is normal. No respiratory distress. Breath sounds: Normal breath sounds. Abdominal:      General: Bowel sounds are normal.      Palpations: Abdomen is soft. Tenderness: There is no abdominal tenderness. Musculoskeletal:      Right lower leg: No edema. Left lower leg: No edema. Skin:     General: Skin is warm. Neurological:      Mental Status: He is alert.    Psychiatric:         Mood and Affect: Mood normal.         Behavior: Behavior normal.          Additional Data:     Labs:  Results from last 7 days   Lab Units 23  0525 23  0500   WBC Thousand/uL 10.14 10.05   HEMOGLOBIN g/dL 17.2* 16.1   HEMATOCRIT % 50.5* 45.7   PLATELETS Thousands/uL 325 315   NEUTROS PCT %  --  55 LYMPHS PCT %  --  33   MONOS PCT %  --  8   EOS PCT %  --  3     Results from last 7 days   Lab Units 08/03/23  0525 08/02/23  0518 08/01/23  0500   SODIUM mmol/L 141   < > 140   POTASSIUM mmol/L 3.5   < > 3.4*   CHLORIDE mmol/L 106   < > 111*   CO2 mmol/L 30   < > 24   BUN mg/dL 17   < > 9   CREATININE mg/dL 0.92   < > 0.75   ANION GAP mmol/L 5   < > 5   CALCIUM mg/dL 9.9   < > 9.0   ALBUMIN g/dL  --   --  3.8   TOTAL BILIRUBIN mg/dL  --   --  0.37   ALK PHOS U/L  --   --  65   ALT U/L  --   --  50   AST U/L  --   --  17   GLUCOSE RANDOM mg/dL 89   < > 90    < > = values in this interval not displayed. Results from last 7 days   Lab Units 08/03/23  0525   INR  1.11                   Lines/Drains:  Invasive Devices     Peripheral Intravenous Line  Duration           Peripheral IV 08/01/23 Right Antecubital 2 days                  Telemetry:  Telemetry Orders (From admission, onward)             24 Hour Telemetry Monitoring  Continuous x 24 Hours (Telem)        Question:  Reason for 24 Hour Telemetry  Answer:  Arrhythmias requiring acute medical intervention / PPM or ICD malfunction                 Telemetry Reviewed: Normal Sinus Rhythm  Indication for Continued Telemetry Use: Arrthymias requiring medical therapy             Imaging: Reviewed radiology reports from this admission including: ECHO and stress test, cardiac MRI    Recent Cultures (last 7 days):         Last 24 Hours Medication List:   Current Facility-Administered Medications   Medication Dose Route Frequency Provider Last Rate   • acetaminophen  650 mg Oral Q6H PRN Toby Williamsone, DO     • levalbuterol  1.25 mg Nebulization Q8H PRN Toby Dole, DO     • vancomycin  1,000 mg Intravenous Once Cherie Issa PA-C          Today, Patient Was Seen By: Luda Fang PA-C    **Please Note: This note may have been constructed using a voice recognition system. **

## 2023-08-04 ENCOUNTER — TRANSITIONAL CARE MANAGEMENT (OUTPATIENT)
Dept: FAMILY MEDICINE CLINIC | Facility: CLINIC | Age: 23
End: 2023-08-04

## 2023-08-07 ENCOUNTER — RA CDI HCC (OUTPATIENT)
Dept: OTHER | Facility: HOSPITAL | Age: 23
End: 2023-08-07

## 2023-08-07 NOTE — PROGRESS NOTES
[address] [facility] coding opportunities       Chart reviewed, no opportunity found: CHART REVIEWED, NO OPPORTUNITY FOUND     Patients Insurance     Commercial Insurance: Evaristo Fuchs

## 2023-08-09 NOTE — UTILIZATION REVIEW
NOTIFICATION OF ADMISSION DISCHARGE   This is a Notification of Discharge from [address] [facility]. Please be advised that this patient has been discharge from our facility. Below you will find the admission and discharge date and time including the patient’s disposition. UTILIZATION REVIEW CONTACT:  Remi PULLIAM [facility])  Utilization   Network Utilization Review Department  Phone: [phone] x carefully listen to the prompts. All voicemails are confidential.  Email: [email or web address]     ADMISSION INFORMATION  PRESENTATION DATE: 7/31/2023 11:37 PM  OBERVATION ADMISSION DATE: 8/1/23  INPATIENT ADMISSION DATE: 8/1/23 12:31 PM   DISCHARGE DATE: 8/3/2023  4:50 PM   DISPOSITION:Home/Self Care    IMPORTANT INFORMATION:  Send all requests for admission clinical reviews, approved or denied determinations and any other requests to dedicated fax number below belonging to the campus where the patient is receiving treatment.  List of dedicated fax numbers:  [place] DENIALS (Administrative/Medical Necessity) [phone] [address] [facility] (Maternity/NICU/Pediatrics) [phone]   [facility] [phone]   [facility] [phone]   [address] [phone]   [address] [place] [address] [phone]   [facility] [phone]   [address] [facility] [address] [facility] [phone]   [address] [facility] [phone]   [address] [facility] [phone] [address] [facility] [address] [phone]

## 2023-08-14 NOTE — DISCHARGE SUMMARY
[address] [facility]  Discharge- Marcia Taylor [facility] 2000, 20 y.o. male MRN: [id number]  Unit/Bed#: MS Sol-01 Encounter: [id number]  Primary Care Provider: Zenaida Sanchez DO   Date and time admitted to hospital: 7/31/2023 11:37 PM    * Palpitations  Assessment & Plan  · Patient presented with palpitations causing him symptoms of lightheadedness and dizziness as well as SOB. EKG on his apple watch showed a wide complex tachycardia, ? VT. He was admitted for further evaluation and EP consult. · Holter in the past negative for arhythmia   · Cardiac MRI revealed normal bilateral ventricular sizes, no evidence of ARVD, normal bilateral atria, no valvular abnormalities, and no evidence of myocardial inflammation/infiltrative disease/scarring  · Echo showed EF 55% without any structural abnormalities. · Stress echo negative   · EP performed Loop implant today  · EP cleared the patient from their standpoint for discharge today      Medical Problems     Resolved Problems  Date Reviewed: 8/14/2023   None       Discharging Physician / Practitioner: Jannet Quarles PA-C  PCP: Zenaida Sanchez DO  Admission Date:   Admission Orders (From admission, onward)     Ordered        08/01/23 1231  Inpatient Admission  Once            08/01/23 0147  Place in Observation  Once                      Discharge Date: 08/14/23    Consultations During Hospital Stay:  · EP    Procedures Performed:   · CXR  · Cardiac MRI  · Echo  · Stress test  · Loop recorder implant  · CBC  · CMP  · HS troponin  · D-dimer  · TSH    Significant Findings / Test Results:   · CXR: "No evidence of acute cardiopulmonary disease."  · Cardiac MRI: "Impression: 1. Normal left ventricular size and systolic function. 2. Normal right ventricular size and systolic function. No evidence of ARVD. 3. Normal bilateral atria. No valvular abnormalities.  4. No evidence of myocardial inflammation, infiltrative disease or scarring."  · Echo: LVEF 55%, wall motion is normal. Diastolic function is normal  · Stress test: "No electrocardiographic or echocardiographic evidence of inducible ischemia after maximal exercise. "  · Loop recorder implant: "The patient came in to the laboratory for linq device placement. The device has been placed and patient tolerated the procedure well"  · HS troponin: 3  · D-dimer: <0.27  · TSH: 1.704    Incidental Findings:   · None     Test Results Pending at Discharge (will require follow up): · None     Outpatient Tests Requested:  · Follow up with PCP and EP    Complications:  None    Reason for Admission: Palpitations     Hospital Course:   Ayala Michaud is a 20 y.o. male with asthma who originally presented to the hospital on 7/31/2023 due to palpitations and dizziness. Apple watch showed a wide complex tachycardia. EP was consulted. He underwent echo, stress test, and cardiac MRI as above. EP placed loop recorder. EP cleared the patient from their standpoint for discharge. Follow up with EP and PCP. Please see above list of diagnoses and related plan for additional information. Condition at Discharge: stable    Discharge Day Visit / Exam:   * Please refer to separate progress note for these details *    Discussion with Family: Updated  (mother) at bedside. Discharge instructions/Information to patient and family:   See after visit summary for information provided to patient and family. Provisions for Follow-Up Care:  See after visit summary for information related to follow-up care and any pertinent home health orders. Disposition:   Home    Planned Readmission: None     Discharge Statement:  I spent 36 minutes discharging the patient. This time was spent on the day of discharge. I had direct contact with the patient on the day of discharge.  Greater than 50% of the total time was spent examining patient, answering all patient questions, arranging and discussing plan of care with patient as well as directly providing post-discharge instructions. Additional time then spent on discharge activities. Discharge Medications:  See after visit summary for reconciled discharge medications provided to patient and/or family.       **Please Note: This note may have been constructed using a voice recognition system**

## 2023-08-16 ENCOUNTER — OFFICE VISIT (OUTPATIENT)
Dept: FAMILY MEDICINE CLINIC | Facility: CLINIC | Age: 23
End: 2023-08-16
Payer: COMMERCIAL

## 2023-08-16 VITALS
OXYGEN SATURATION: 99 % | DIASTOLIC BLOOD PRESSURE: 90 MMHG | HEIGHT: 69 IN | WEIGHT: 238 LBS | SYSTOLIC BLOOD PRESSURE: 128 MMHG | HEART RATE: 80 BPM | TEMPERATURE: 98.9 F | RESPIRATION RATE: 18 BRPM | BODY MASS INDEX: 35.25 KG/M2

## 2023-08-16 DIAGNOSIS — Z76.89 ENCOUNTER FOR SUPPORT AND COORDINATION OF TRANSITION OF CARE: Primary | ICD-10-CM

## 2023-08-16 DIAGNOSIS — R00.2 PALPITATIONS: ICD-10-CM

## 2023-08-16 PROCEDURE — 99495 TRANSJ CARE MGMT MOD F2F 14D: CPT | Performed by: FAMILY MEDICINE

## 2023-08-16 NOTE — PROGRESS NOTES
Assessment & Plan     Chief Complaint   Patient presents with   • Transition of Care Management     8/1/23-8/3/23 Palpitations     1. Encounter for support and coordination of transition of care    2. Palpitations         Subjective     Transitional Care Management Review:   Marry Rosenthal is a 20 y.o. male here for TCM follow up. During the TCM phone call patient stated:  TCM Call     Date and time call was made  8/4/2023  2:24 PM    Hospital care reviewed  Records reviewed    Patient was hospitialized at  [facility]    Date of Admission  07/31/23    Date of discharge  08/03/23    Diagnosis  Palpitations    Disposition  Home      TCM Call     Scheduled for follow up? Yes    Patients specialists  Cardiologist    Cardiologist name  [facility]    I have advised the patient to call PCP with any new or worsening symptoms  Kenya Gibson LPN        TCM, palpations. Denies any palpations since admission. Episode started after coughing. Has appt tomorrow and in one month 0- cardiology follow up. Review of Systems   Constitutional: Negative. HENT: Negative. Eyes: Negative. Respiratory: Negative. Cardiovascular: Negative. Gastrointestinal: Negative. Genitourinary: Negative. Musculoskeletal: Negative. Skin: Negative. Neurological: Negative. Psychiatric/Behavioral: Negative.         Objective     /90 (BP Location: Left arm, Patient Position: Sitting, Cuff Size: Large)   Pulse 80   Temp 98.9 °F (37.2 °C) (Oral)   Resp 18   Ht 5' 9" (1.753 m)   Wt 108 kg (238 lb)   SpO2 99%   BMI 35.15 kg/m²      Current Outpatient Medications:   •  albuterol (ProAir HFA) 90 mcg/act inhaler, Inhale 2 puffs every 6 (six) hours as needed for wheezing, Disp: 18 g, Rfl: 0  •  Budeson-Glycopyrrol-Formoterol (Breztri Aerosphere) 160-9-4.8 MCG/ACT AERO, Inhale 2 puffs 2 (two) times a day as needed (as needed) Rinse mouth after use., Disp: 10.7 g, Rfl: 5  •  cetirizine (ZyrTEC) 10 mg tablet, Take 10 mg by mouth in the morning., Disp: , Rfl:   •  Multiple Vitamin (ONE-A-DAY MENS PO), Take by mouth, Disp: , Rfl:   Physical Exam  Vitals and nursing note reviewed. Constitutional:       General: He is not in acute distress. Appearance: He is well-developed. HENT:      Head: Normocephalic and atraumatic. Right Ear: External ear normal.      Nose: Nose normal.      Mouth/Throat:      Mouth: Mucous membranes are moist.      Pharynx: Oropharynx is clear. Eyes:      Conjunctiva/sclera: Conjunctivae normal.   Cardiovascular:      Rate and Rhythm: Normal rate and regular rhythm. Heart sounds: Normal heart sounds. No murmur heard. Pulmonary:      Effort: Pulmonary effort is normal. No respiratory distress. Breath sounds: Normal breath sounds. Abdominal:      Tenderness: There is no abdominal tenderness. Musculoskeletal:         General: No swelling. Cervical back: Neck supple. Skin:     General: Skin is warm and dry. Capillary Refill: Capillary refill takes less than 2 seconds. Neurological:      Mental Status: He is alert and oriented to person, place, and time. Psychiatric:         Mood and Affect: Mood normal.         Behavior: Behavior normal.         Thought Content:  Thought content normal.         Judgment: Judgment normal.       Medications have been reviewed by provider in current encounter  Allergies   Allergen Reactions   • Penicillins Rash   Social History     Anna Davis, DO

## 2023-08-17 ENCOUNTER — IN-CLINIC DEVICE VISIT (OUTPATIENT)
Dept: CARDIOLOGY CLINIC | Facility: CLINIC | Age: 23
End: 2023-08-17
Payer: COMMERCIAL

## 2023-08-17 DIAGNOSIS — Z95.818 PRESENCE OF OTHER CARDIAC IMPLANTS AND GRAFTS: Primary | ICD-10-CM

## 2023-08-17 PROCEDURE — 93291 INTERROG DEV EVAL SCRMS IP: CPT | Performed by: INTERNAL MEDICINE

## 2023-08-17 NOTE — PROGRESS NOTES
Results for orders placed or performed in visit on 08/17/23   Cardiac EP device report    Narrative    CRISTO Rodarte INTERROGATED IN THE [facility] OFFICE. BATTERY STATUS "GOOD". NO DEVICE DETECTED & NO PT ACTIVATED EPISODES. PRESENTING RHYTHM NSR. NORMAL DEVICE FUNCTION. WOUND CHECK: INCISION CLEAN AND DRY WITH EDGES APPROXIMATED; SUTURES REMOVED; WOUND CARE AND RESTRICTIONS REVIEWED WITH PATIENT.  GV

## 2023-08-21 DIAGNOSIS — J45.20 MILD INTERMITTENT REACTIVE AIRWAY DISEASE WITHOUT COMPLICATION: ICD-10-CM

## 2023-08-21 RX ORDER — BUDESONIDE, GLYCOPYRROLATE, AND FORMOTEROL FUMARATE 160; 9; 4.8 UG/1; UG/1; UG/1
2 AEROSOL, METERED RESPIRATORY (INHALATION) 2 TIMES DAILY PRN
Qty: 10.7 G | Refills: 5 | Status: SHIPPED | OUTPATIENT
Start: 2023-08-21

## 2023-08-21 RX ORDER — ALBUTEROL SULFATE 90 UG/1
2 AEROSOL, METERED RESPIRATORY (INHALATION) EVERY 6 HOURS PRN
Qty: 18 G | Refills: 3 | Status: SHIPPED | OUTPATIENT
Start: 2023-08-21

## 2023-08-21 RX ORDER — BUDESONIDE, GLYCOPYRROLATE, AND FORMOTEROL FUMARATE 160; 9; 4.8 UG/1; UG/1; UG/1
2 AEROSOL, METERED RESPIRATORY (INHALATION) 2 TIMES DAILY PRN
Refills: 5 | OUTPATIENT
Start: 2023-08-21

## 2023-09-20 PROBLEM — Z95.818 IMPLANTABLE LOOP RECORDER PRESENT: Status: ACTIVE | Noted: 2023-09-20

## 2023-09-20 PROBLEM — R00.0 WIDE-COMPLEX TACHYCARDIA: Status: ACTIVE | Noted: 2023-09-20

## 2023-09-21 NOTE — PROGRESS NOTES
Electrophysiology Hospital Follow Up  [address] [facility] Cardiology [facility], [facility]    Name: Krystal Navarro  : 2000  MRN: [id number]    ASSESSMENT:  1. Palpitations        2. Wide-complex tachycardia        3. Status post Medtronic loop recorder 8/3/2023            PLAN:  1. Wide complex tachycardia, symptomatic   - had Apple watch strips of WCT - unclear if VT vs SVT with abbherrancy   - EF of 55% per echo 2023  - echo stress test 2023 showing no evidence of ischemia  - cMRI 2023 showing no evidence of inflammation, infiltrative disease, scarring or ARVD  - loop implanted for WCT monitoring - when symptomatic showing PAC's and sinus tachycardia with possible SVT   2. Palpitations, now with suspected SVT  - usually associated with PAC's on prior Holter and loop   - lately 2023 now with 2 distinct episodes of tachyarrhythmia (no abrupt start/stop but definite change in morphology)  3. Medtronic loop recorder in situ  - implanted 8/3/2023 for further monitoring for WCT  4. Obesity with BMI of 35  5. Asthma     IMPRESSION:  1. WCT - Thankfully, loop recorder has not shown any tracings concerning for ventricular tachycardia. Interestingly though, he has had tachycardia that could appear to be SVT. There is an abrupt onset with change in morphology. Did discuss with patient that there are multiple ways to tackle this. First being medications - discussed starting metoprolol succinate 25 mg daily at night with him. Discussed possible side effects and he will call the office in about a week to let us know how he is tolerating this new medication. He is concerned about the shortness of breath with this and whether there is a component of asthma. I asked him to start with this medication and if we do not see an improvement in the shortness of breath during that time, he will reach out to his PCP.     Did also discuss the possibility of EP study. Patient would prefer to hold off for now which is understandable as we are not sure exactly if/what arrhythmia we are dealing with from the top chambers of the heart. We will assess his response to medications first.    If patient does not tolerate beta-blocker well, can trial either diltiazem or flecainide. Patient is to follow up in our EP office in 1-2 months with Dr. Michael Verdugo. He is to call our office with any further questions or concerns in the meantime. HPI:   Interim history: Rose Mary Villeda is a 20 y.o. male with symptomatic wide-complex tachycardia, palpitations and PACs, preserved LVEF and no structural heart disease, Medtronic loop recorder in situ, obesity with BMI of 35, and asthma. He presents today for hospital follow-up. Patient presented last month to the emergency room after having severe palpitations and Apple Watch tracings that showed what appeared to be a wide-complex tachycardia. Patient has had palpitations in the past for which he was found to have PACs. However, he had had 3 more severe episodes, the last capturing tracing seen below. As there was concern for ventricular tachycardia, and patient did have a family history of his father having a cardiomyopathy with ICD in place, he underwent full cardiac work-up with echo, echo stress test, and cardiac MRI. There were no significant abnormalities with these tests and no on his last day of hospitalization, it was discussed among most EP's if EP study would be warranted. Ultimately, patient had decided that he would prefer for further monitoring with loop recorder prior to committing himself to EP study and potential ICD. Since discharge, patient has activated symptoms on loop recorder with some tracings showing PACs, some sinus tachycardia, and more interestingly recently showing potential SVT. Patient is not on cardiac medications as an outpatient.     Patient reported that he had felt well until a couple weeks ago. 2 Sundays ago, patient had just come in from mowing the lawn when he sat down to have lunch and his heart rate sped up into the 130s and stayed there for a little while. He then had another episode Sunday. He is concerned to exercise given his rhythm disturbance and the associated shortness of breath. He does have a history of asthma but denies having more symptoms around this time of the year and has not really required other medications for this lately. He also asked if there was a correlation between this arrhythmia and food as he definitely feels his heart rate increases after a bigger meal, does have processed foods. Rhythm History:  1. Hospitalization 8/2023 for severe palpitations with Apple watch showing WCT   A. Has had palps in the past - Holter showing PAC's   B. Family history of father having NICMP with ICD   C. Three prior episodes of this - last one on the couch and cough (LH, SOB, presyncopal)  2. Underwent full cardiac work up:   A. EF of 55% per echo 8/1/2023   B. Echo stress test 8/2/2023 showing no evidence of ischemia   C. cMRI 8/2/2023 showing no evidence of inflammation, infiltrative disease, scarring or ARVD   D. Loop implanted for WCT monitoring   E. Concern after 2nd opinion for VT vs artifact   F. EP study held off on  3. Loop showing PAC's, ST, possible SVT    EKG: sinus rhythm with sinus arrhythmia at 63 beats per minute    LOOP:   Potential for SVT -        Apple Watch Strips:              ROS:   Review of Systems   Constitutional: Negative for chills, diaphoresis, fever and malaise/fatigue. Cardiovascular: Positive for palpitations. Negative for chest pain, claudication, cyanosis, dyspnea on exertion, irregular heartbeat, leg swelling, near-syncope, orthopnea, paroxysmal nocturnal dyspnea and syncope. Respiratory: Negative for shortness of breath and sleep disturbances due to breathing.     Neurological: Positive for dizziness (occasional) and light-headedness (occasional). All other systems reviewed and are negative. occassional     OBJECTIVE:   Vitals:   Pulse 63   Ht 5' 9" (1.753 m)   Wt 105 kg (232 lb 4.8 oz)   BMI 34.30 kg/m²  /82      Physical Exam:   Physical Exam  Vitals and nursing note reviewed. Constitutional:       General: He is not in acute distress. Appearance: He is well-developed. He is not diaphoretic. HENT:      Head: Normocephalic and atraumatic. Eyes:      Pupils: Pupils are equal, round, and reactive to light. Neck:      Vascular: No JVD. Cardiovascular:      Rate and Rhythm: Normal rate and regular rhythm. Heart sounds: No murmur heard. No friction rub. No gallop. Pulmonary:      Effort: Pulmonary effort is normal. No respiratory distress. Breath sounds: Normal breath sounds. No wheezing. Abdominal:      Palpations: Abdomen is soft. Musculoskeletal:         General: Normal range of motion. Cervical back: Normal range of motion. Skin:     General: Skin is warm and dry. Neurological:      Mental Status: He is alert and oriented to person, place, and time.          Medications:      Current Outpatient Medications:   •  albuterol (ProAir HFA) 90 mcg/act inhaler, Inhale 2 puffs every 6 (six) hours as needed for wheezing, Disp: 18 g, Rfl: 3  •  Budeson-Glycopyrrol-Formoterol (Breztri Aerosphere) 160-9-4.8 MCG/ACT AERO, Inhale 2 puffs 2 (two) times a day as needed (as needed) Rinse mouth after use., Disp: 10.7 g, Rfl: 5  •  cetirizine (ZyrTEC) 10 mg tablet, Take 10 mg by mouth in the morning., Disp: , Rfl:   •  Multiple Vitamin (ONE-A-DAY MENS PO), Take by mouth, Disp: , Rfl:      Family History   Problem Relation Age of Onset   • Cardiomyopathy Father      Social History     Socioeconomic History   • Marital status: Single     Spouse name: Not on file   • Number of children: Not on file   • Years of education: Not on file   • Highest education level: Not on file   Occupational History • Not on file   Tobacco Use   • Smoking status: Never   • Smokeless tobacco: Never   Vaping Use   • Vaping Use: Never used   Substance and Sexual Activity   • Alcohol use: Yes     Alcohol/week: 3.0 standard drinks of alcohol     Types: 3 Cans of beer per week   • Drug use: Never   • Sexual activity: Not on file   Other Topics Concern   • Not on file   Social History Narrative   • Not on file     Social Determinants of Health     Financial Resource Strain: Not on file   Food Insecurity: No Food Insecurity (8/2/2023)    Hunger Vital Sign    • Worried About Running Out of Food in the Last Year: Never true    • Ran Out of Food in the Last Year: Never true   Transportation Needs: No Transportation Needs (8/2/2023)    PRAPARE - Transportation    • Lack of Transportation (Medical): No    • Lack of Transportation (Non-Medical): No   Physical Activity: Not on file   Stress: Not on file   Social Connections: Not on file   Intimate Partner Violence: Not on file   Housing Stability: Unknown (8/2/2023)    Housing Stability Vital Sign    • Unable to Pay for Housing in the Last Year: No    • Number of Places Lived in the Last Year: Not on file    • Unstable Housing in the Last Year: Not on file     Social History     Tobacco Use   Smoking Status Never   Smokeless Tobacco Never     Social History     Substance and Sexual Activity   Alcohol Use Yes   • Alcohol/week: 3.0 standard drinks of alcohol   • Types: 3 Cans of beer per week       Labs & Results:  Below is the patient's most recent value for Albumin, ALT, AST, BUN, Calcium, Chloride, Cholesterol, CO2, Creatinine, GFR, Glucose, HDL, Hematocrit, Hemoglobin, Hemoglobin A1C, LDL, Magnesium, Phosphorus, Platelets, Potassium, PSA, Sodium, Triglycerides, and WBC.    Lab Results   Component Value Date    ALT 50 08/01/2023    AST 17 08/01/2023    BUN 17 08/03/2023    CALCIUM 9.9 08/03/2023     08/03/2023    CO2 30 08/03/2023    CREATININE 0.92 08/03/2023    HCT 50.5 (H) 08/03/2023    HGB 17.2 (H) 08/03/2023    MG 2.4 08/03/2023     08/03/2023    K 3.5 08/03/2023    WBC 10.14 08/03/2023     Note: for a comprehensive list of the patient's lab results, access the Results Review activity. CARDIAC TESTING:   ECHO:   No results found for this or any previous visit. No results found for this or any previous visit. CATH:  No results found for this or any previous visit. STRESS TEST:  No results found for this or any previous visit.

## 2023-09-27 ENCOUNTER — OFFICE VISIT (OUTPATIENT)
Dept: CARDIOLOGY CLINIC | Facility: CLINIC | Age: 23
End: 2023-09-27
Payer: COMMERCIAL

## 2023-09-27 VITALS — HEIGHT: 69 IN | WEIGHT: 232.3 LBS | BODY MASS INDEX: 34.41 KG/M2 | HEART RATE: 63 BPM

## 2023-09-27 DIAGNOSIS — R00.0 WIDE-COMPLEX TACHYCARDIA: ICD-10-CM

## 2023-09-27 DIAGNOSIS — Z95.818 IMPLANTABLE LOOP RECORDER PRESENT: ICD-10-CM

## 2023-09-27 DIAGNOSIS — R00.2 PALPITATIONS: Primary | ICD-10-CM

## 2023-09-27 DIAGNOSIS — J45.20 MILD INTERMITTENT REACTIVE AIRWAY DISEASE WITHOUT COMPLICATION: ICD-10-CM

## 2023-09-27 PROCEDURE — 93000 ELECTROCARDIOGRAM COMPLETE: CPT | Performed by: PHYSICIAN ASSISTANT

## 2023-09-27 PROCEDURE — 99214 OFFICE O/P EST MOD 30 MIN: CPT | Performed by: PHYSICIAN ASSISTANT

## 2023-09-27 RX ORDER — METOPROLOL SUCCINATE 25 MG/1
25 TABLET, EXTENDED RELEASE ORAL DAILY
Qty: 30 TABLET | Refills: 1 | Status: SHIPPED | OUTPATIENT
Start: 2023-09-27

## 2023-10-06 ENCOUNTER — TELEPHONE (OUTPATIENT)
Dept: CARDIOLOGY CLINIC | Facility: CLINIC | Age: 23
End: 2023-10-06

## 2023-10-06 NOTE — TELEPHONE ENCOUNTER
----- Message from Mandy Hernandez sent at 10/6/2023 10:49 AM EDT -----  Regarding: Follow up  Hello,    I saw this patient over a more than a week ago and started him on low-dose beta-blocker. Is there anyone that is able to call him and see how he has been doing on this medication? This is not an urgent thing so if you cannot, just send me a message by the end of the day and I will call him on Monday instead.     Thanks in advance,  [employer]

## 2023-10-09 NOTE — TELEPHONE ENCOUNTER
Spoke to pt. He stated he's still having flutters, but not as frequently. They're still daily, but not as often throughout the day. Episodes last only 1-2 seconds long. Please review.

## 2023-10-11 NOTE — TELEPHONE ENCOUNTER
Called and spoke to Sarahy Servin. He will be continuing with the Metoprolol therapy as per Zander Lyn PA-C    Thank you.

## 2023-10-20 DIAGNOSIS — R00.0 WIDE-COMPLEX TACHYCARDIA: ICD-10-CM

## 2023-10-20 DIAGNOSIS — R00.2 PALPITATIONS: ICD-10-CM

## 2023-10-20 RX ORDER — METOPROLOL SUCCINATE 25 MG/1
25 TABLET, EXTENDED RELEASE ORAL DAILY
Qty: 90 TABLET | Refills: 1 | Status: SHIPPED | OUTPATIENT
Start: 2023-10-20

## 2023-11-02 ENCOUNTER — HOSPITAL ENCOUNTER (EMERGENCY)
Facility: HOSPITAL | Age: 23
Discharge: HOME | End: 2023-11-02
Attending: EMERGENCY MEDICINE
Payer: COMMERCIAL

## 2023-11-02 ENCOUNTER — APPOINTMENT (EMERGENCY)
Dept: RADIOLOGY | Facility: HOSPITAL | Age: 23
End: 2023-11-02
Payer: COMMERCIAL

## 2023-11-02 VITALS
DIASTOLIC BLOOD PRESSURE: 79 MMHG | SYSTOLIC BLOOD PRESSURE: 128 MMHG | OXYGEN SATURATION: 98 % | HEART RATE: 76 BPM | RESPIRATION RATE: 20 BRPM | TEMPERATURE: 98.1 F

## 2023-11-02 DIAGNOSIS — R00.2 PALPITATIONS: Primary | ICD-10-CM

## 2023-11-02 DIAGNOSIS — R06.00 DYSPNEA: ICD-10-CM

## 2023-11-02 DIAGNOSIS — R00.0 TACHYCARDIA: ICD-10-CM

## 2023-11-02 LAB
ANION GAP SERPL CALCULATED.3IONS-SCNC: 11 MMOL/L
BASOPHILS # BLD AUTO: 0.05 THOUSANDS/ÂΜL (ref 0–0.1)
BASOPHILS NFR BLD AUTO: 0 % (ref 0–1)
BUN SERPL-MCNC: 8 MG/DL (ref 5–25)
CALCIUM SERPL-MCNC: 9.6 MG/DL (ref 8.4–10.2)
CARDIAC TROPONIN I PNL SERPL HS: 2 NG/L
CHLORIDE SERPL-SCNC: 104 MMOL/L (ref 96–108)
CO2 SERPL-SCNC: 24 MMOL/L (ref 21–32)
CREAT SERPL-MCNC: 0.63 MG/DL (ref 0.6–1.3)
D DIMER PPP FEU-MCNC: <0.27 UG/ML FEU
EOSINOPHIL # BLD AUTO: 0.29 THOUSAND/ÂΜL (ref 0–0.61)
EOSINOPHIL NFR BLD AUTO: 2 % (ref 0–6)
ERYTHROCYTE [DISTWIDTH] IN BLOOD BY AUTOMATED COUNT: 11.9 % (ref 11.6–15.1)
GFR SERPL CREATININE-BSD FRML MDRD: 136 ML/MIN/1.73SQ M
GLUCOSE SERPL-MCNC: 92 MG/DL (ref 65–140)
HCT VFR BLD AUTO: 47.5 % (ref 36.5–49.3)
HGB BLD-MCNC: 16.3 G/DL (ref 12–17)
IMM GRANULOCYTES # BLD AUTO: 0.03 THOUSAND/UL (ref 0–0.2)
IMM GRANULOCYTES NFR BLD AUTO: 0 % (ref 0–2)
LYMPHOCYTES # BLD AUTO: 1.68 THOUSANDS/ÂΜL (ref 0.6–4.47)
LYMPHOCYTES NFR BLD AUTO: 14 % (ref 14–44)
MAGNESIUM SERPL-MCNC: 1.8 MG/DL (ref 1.9–2.7)
MCH RBC QN AUTO: 28.5 PG (ref 26.8–34.3)
MCHC RBC AUTO-ENTMCNC: 34.3 G/DL (ref 31.4–37.4)
MCV RBC AUTO: 83 FL (ref 82–98)
MONOCYTES # BLD AUTO: 0.64 THOUSAND/ÂΜL (ref 0.17–1.22)
MONOCYTES NFR BLD AUTO: 5 % (ref 4–12)
NEUTROPHILS # BLD AUTO: 9.34 THOUSANDS/ÂΜL (ref 1.85–7.62)
NEUTS SEG NFR BLD AUTO: 79 % (ref 43–75)
NRBC BLD AUTO-RTO: 0 /100 WBCS
PLATELET # BLD AUTO: 320 THOUSANDS/UL (ref 149–390)
PMV BLD AUTO: 9.2 FL (ref 8.9–12.7)
POTASSIUM SERPL-SCNC: 3.3 MMOL/L (ref 3.5–5.3)
RBC # BLD AUTO: 5.71 MILLION/UL (ref 3.88–5.62)
SODIUM SERPL-SCNC: 139 MMOL/L (ref 135–147)
WBC # BLD AUTO: 12.03 THOUSAND/UL (ref 4.31–10.16)

## 2023-11-02 PROCEDURE — 36415 COLL VENOUS BLD VENIPUNCTURE: CPT

## 2023-11-02 PROCEDURE — 93005 ELECTROCARDIOGRAM TRACING: CPT

## 2023-11-02 PROCEDURE — 84484 ASSAY OF TROPONIN QUANT: CPT

## 2023-11-02 PROCEDURE — 85379 FIBRIN DEGRADATION QUANT: CPT

## 2023-11-02 PROCEDURE — 99285 EMERGENCY DEPT VISIT HI MDM: CPT

## 2023-11-02 PROCEDURE — 71045 X-RAY EXAM CHEST 1 VIEW: CPT

## 2023-11-02 PROCEDURE — 99285 EMERGENCY DEPT VISIT HI MDM: CPT | Performed by: EMERGENCY MEDICINE

## 2023-11-02 PROCEDURE — 80048 BASIC METABOLIC PNL TOTAL CA: CPT

## 2023-11-02 PROCEDURE — NC001 PR NO CHARGE: Performed by: EMERGENCY MEDICINE

## 2023-11-02 PROCEDURE — 83735 ASSAY OF MAGNESIUM: CPT

## 2023-11-02 PROCEDURE — 85025 COMPLETE CBC W/AUTO DIFF WBC: CPT

## 2023-11-02 RX ORDER — LANOLIN ALCOHOL/MO/W.PET/CERES
400 CREAM (GRAM) TOPICAL 2 TIMES DAILY
Status: DISCONTINUED | OUTPATIENT
Start: 2023-11-02 | End: 2023-11-03 | Stop reason: HOSPADM

## 2023-11-02 RX ORDER — POTASSIUM CHLORIDE 20 MEQ/1
20 TABLET, EXTENDED RELEASE ORAL ONCE
Status: COMPLETED | OUTPATIENT
Start: 2023-11-02 | End: 2023-11-02

## 2023-11-02 RX ADMIN — POTASSIUM CHLORIDE 20 MEQ: 1500 TABLET, EXTENDED RELEASE ORAL at 22:21

## 2023-11-02 RX ADMIN — MAGNESIUM OXIDE TAB 400 MG (241.3 MG ELEMENTAL MG) 400 MG: 400 (241.3 MG) TAB at 22:35

## 2023-11-03 ENCOUNTER — TELEPHONE (OUTPATIENT)
Dept: CARDIOLOGY CLINIC | Facility: CLINIC | Age: 23
End: 2023-11-03

## 2023-11-03 LAB
ATRIAL RATE: 90 BPM
ATRIAL RATE: 97 BPM
P AXIS: 55 DEGREES
P AXIS: 75 DEGREES
PR INTERVAL: 152 MS
PR INTERVAL: 176 MS
QRS AXIS: 28 DEGREES
QRS AXIS: 59 DEGREES
QRSD INTERVAL: 90 MS
QRSD INTERVAL: 94 MS
QT INTERVAL: 352 MS
QT INTERVAL: 354 MS
QTC INTERVAL: 430 MS
QTC INTERVAL: 449 MS
T WAVE AXIS: 11 DEGREES
T WAVE AXIS: 31 DEGREES
VENTRICULAR RATE: 90 BPM
VENTRICULAR RATE: 97 BPM

## 2023-11-03 PROCEDURE — 93010 ELECTROCARDIOGRAM REPORT: CPT | Performed by: INTERNAL MEDICINE

## 2023-11-03 NOTE — TELEPHONE ENCOUNTER
Pt called stating he was in the ED yesterday c/o palps, sob, and dizziness. He would like to know what the next steps are. He would like to restart metoprolol as it worked in the past for him and ED recommended. Please advise. Thank you!

## 2023-11-03 NOTE — ED PROVIDER NOTES
History  Chief Complaint   Patient presents with    Shortness of Breath     [place] the airport trying to  luggage and felt sob/dizzy. Patient called 911 and was checked out but started feeling better. Patient went home and then started feeling sob again. -cp +dizzy, tingling in feet and hands     This is a 78-year-old male with history of recurrent palpitations is presenting due to acute onset shortness of breath, fast heartbeat, and lightheadedness. Patient reports that he was at the airport this evening and was bending over to  suitcases when he suddenly felt his heart racing, shortness of breath, and symptoms of dizziness/lightheadedness. Patient reports that his symptoms have been persistent for the past several hours. Of note, patient has been seen by cardiology in the past for palpitations with some concern for ventricular tachycardia versus SVT with aberrancy and is status post loop recorder implantation on 8/3/2023. Patient is denying any chest pain. He is denying any recent surgeries, immobilizations, history of blood clot, history of malignancy, unilateral leg swelling, or recent long distance travel. He is furthermore denying any symptoms of infection including cough, congestion, fevers, nausea, vomiting, diarrhea, constipation. Of note, patient was taken off of his metoprolol which he says was controlling his palpitations just last week due to concerns for side effects of lightheadedness and some nausea. Prior to Admission Medications   Prescriptions Last Dose Informant Patient Reported? Taking? Budeson-Glycopyrrol-Formoterol (Breztri Aerosphere) 160-9-4.8 MCG/ACT AERO  Self No No   Sig: Inhale 2 puffs 2 (two) times a day as needed (as needed) Rinse mouth after use.    Multiple Vitamin (ONE-A-DAY MENS PO)  Self Yes No   Sig: Take by mouth   albuterol (ProAir HFA) 90 mcg/act inhaler  Self No No   Sig: Inhale 2 puffs every 6 (six) hours as needed for wheezing cetirizine (ZyrTEC) 10 mg tablet  Self Yes No   Sig: Take 10 mg by mouth in the morning. metoprolol succinate (TOPROL-XL) 25 mg 24 hr tablet   No No   Sig: TAKE 1 TABLET (25 MG TOTAL) BY MOUTH DAILY. Facility-Administered Medications: None       No past medical history on file. Past Surgical History:   Procedure Laterality Date    CARDIAC ELECTROPHYSIOLOGY PROCEDURE N/A 8/3/2023    Procedure: Cardiac loop recorder implant;  Surgeon: Dyana Dao MD;  Location: [facility]; Service: Cardiology       Family History   Problem Relation Age of Onset    Cardiomyopathy Father      I have reviewed and agree with the history as documented. E-Cigarette/Vaping    E-Cigarette Use Never User      E-Cigarette/Vaping Substances    Nicotine No     THC No     CBD No     Flavoring No      Social History     Tobacco Use    Smoking status: Never    Smokeless tobacco: Never   Vaping Use    Vaping Use: Never used   Substance Use Topics    Alcohol use: Not Currently    Drug use: Never        Review of Systems   Constitutional:  Negative for chills, fatigue and fever. HENT:  Negative for congestion and sore throat. Eyes:  Negative for pain and visual disturbance. Respiratory:  Positive for shortness of breath. Negative for cough and chest tightness. Cardiovascular:  Positive for palpitations. Negative for chest pain. Gastrointestinal:  Negative for abdominal pain, blood in stool, constipation, diarrhea, nausea and vomiting. Genitourinary:  Negative for dysuria, flank pain and hematuria. Musculoskeletal:  Negative for arthralgias, back pain and neck pain. Skin:  Negative for color change and rash. Neurological:  Positive for dizziness and light-headedness. Negative for syncope. Hematological:  Negative for adenopathy. Does not bruise/bleed easily. All other systems reviewed and are negative.       Physical Exam  ED Triage Vitals [11/02/23 2117]   Temperature Pulse Respirations Blood Pressure SpO2   98.1 °F (36.7 °C) 96 (!) 24 (!) 179/108 100 %      Temp Source Heart Rate Source Patient Position - Orthostatic VS BP Location FiO2 (%)   Temporal Monitor Lying Left arm --      Pain Score       No Pain             Orthostatic Vital Signs  Vitals:    11/02/23 2117 11/02/23 2315 11/02/23 2345   BP: (!) 179/108 145/92 128/79   Pulse: 96 90 76   Patient Position - Orthostatic VS: Lying Lying Lying       Physical Exam  Vitals and nursing note reviewed. Constitutional:       General: He is not in acute distress. Appearance: He is well-developed and normal weight. He is not toxic-appearing or diaphoretic. HENT:      Head: Normocephalic and atraumatic. Right Ear: External ear normal.      Left Ear: External ear normal.      Nose: Nose normal. No congestion or rhinorrhea. Mouth/Throat:      Mouth: Mucous membranes are moist.      Pharynx: No oropharyngeal exudate or posterior oropharyngeal erythema. Eyes:      General: No scleral icterus. Extraocular Movements: Extraocular movements intact. Conjunctiva/sclera: Conjunctivae normal.      Pupils: Pupils are equal, round, and reactive to light. Cardiovascular:      Rate and Rhythm: Regular rhythm. Tachycardia present. Pulses: Normal pulses. Heart sounds: No murmur heard. Pulmonary:      Effort: Pulmonary effort is normal. No respiratory distress. Breath sounds: Normal breath sounds. No wheezing or rales. Abdominal:      Palpations: Abdomen is soft. There is no mass. Tenderness: There is no abdominal tenderness. There is no right CVA tenderness, left CVA tenderness or guarding. Hernia: No hernia is present. Musculoskeletal:         General: No swelling. Normal range of motion. Cervical back: Normal range of motion and neck supple. Right lower leg: No edema. Left lower leg: No edema. Skin:     General: Skin is warm and dry. Capillary Refill: Capillary refill takes less than 2 seconds. Neurological:      General: No focal deficit present. Mental Status: He is alert and oriented to person, place, and time.    Psychiatric:         Mood and Affect: Mood normal.         Behavior: Behavior normal.         ED Medications  Medications   potassium chloride (K-DUR,KLOR-CON) CR tablet 20 mEq (20 mEq Oral Given 11/2/23 2221)       Diagnostic Studies  Results Reviewed       Procedure Component Value Units Date/Time    HS Troponin 0hr (reflex protocol) [[id number]]  (Normal) Collected: 11/02/23 2243    Lab Status: Final result Specimen: Blood from Arm, Right Updated: 11/02/23 2329     hs TnI 0hr 2 ng/L     Basic metabolic panel [[id number]]  (Abnormal) Collected: 11/02/23 2146    Lab Status: Final result Specimen: Blood from Arm, Left Updated: 11/02/23 2212     Sodium 139 mmol/L      Potassium 3.3 mmol/L      Chloride 104 mmol/L      CO2 24 mmol/L      ANION GAP 11 mmol/L      BUN 8 mg/dL      Creatinine 0.63 mg/dL      Glucose 92 mg/dL      Calcium 9.6 mg/dL      eGFR 136 ml/min/1.73sq m     Narrative:      [facility] guidelines for Chronic Kidney Disease (CKD):     Stage 1 with normal or high GFR (GFR > 90 mL/min/1.73 square meters)    Stage 2 Mild CKD (GFR = 60-89 mL/min/1.73 square meters)    Stage 3A Moderate CKD (GFR = 45-59 mL/min/1.73 square meters)    Stage 3B Moderate CKD (GFR = 30-44 mL/min/1.73 square meters)    Stage 4 Severe CKD (GFR = 15-29 mL/min/1.73 square meters)    Stage 5 End Stage CKD (GFR <15 mL/min/1.73 square meters)  Note: GFR calculation is accurate only with a steady state creatinine    Magnesium [[id number]]  (Abnormal) Collected: 11/02/23 2146    Lab Status: Final result Specimen: Blood from Arm, Left Updated: 11/02/23 2212     Magnesium 1.8 mg/dL     D-dimer, quantitative [[id number]]  (Normal) Collected: 11/02/23 2146    Lab Status: Final result Specimen: Blood from Arm, Right Updated: 11/02/23 2211     D-Dimer, Quant <0.27 ug/ml FEU     CBC and differential [[id number]]  (Abnormal) Collected: 11/02/23 2146    Lab Status: Final result Specimen: Blood from Arm, Right Updated: 11/02/23 2200     WBC 12.03 Thousand/uL      RBC 5.71 Million/uL      Hemoglobin 16.3 g/dL      Hematocrit 47.5 %      MCV 83 fL      MCH 28.5 pg      MCHC 34.3 g/dL      RDW 11.9 %      MPV 9.2 fL      Platelets 127 Thousands/uL      nRBC 0 /100 WBCs      Neutrophils Relative 79 %      Immat GRANS % 0 %      Lymphocytes Relative 14 %      Monocytes Relative 5 %      Eosinophils Relative 2 %      Basophils Relative 0 %      Neutrophils Absolute 9.34 Thousands/µL      Immature Grans Absolute 0.03 Thousand/uL      Lymphocytes Absolute 1.68 Thousands/µL      Monocytes Absolute 0.64 Thousand/µL      Eosinophils Absolute 0.29 Thousand/µL      Basophils Absolute 0.05 Thousands/µL                    X-ray chest 1 view portable   Final Result by Anali Kelly MD (11/03 5016)      No acute cardiopulmonary disease. Stable except for loop recorder placement               Workstation performed: [id number]               Procedures  Procedures      ED Course  ED Course as of 11/03/23 1317   Thu Nov 02, 2023 2205 ECG interpreted by me. Date: 11/2/2023. Time: 2122. Rate: 97 bpm.  Axis: Normal.  Rhythm: Regular. Intervals: Normal.  This is a normal sinus rhythm with occasional PACs. T waves in lead III are different today than they were from August 2023, now are flattened instead of deep inversions. Otherwise morphology is similar. Interpretation: Abnormal ECG, slight change from prior in August 2023.   2239 On reassessment, when discharging patient patient began complaining of chest pain on the left side of his sternum that he says is "aching in quality" and started just a minute ago. Patient will require troponin x2, serial EKGs, and ACS rule out. SBIRT 20yo+      Flowsheet Row Most Recent Value   Initial Alcohol Screen: US AUDIT-C     1.  How often do you have a drink containing alcohol? 0 Filed at: 11/02/2023 2117   2. How many drinks containing alcohol do you have on a typical day you are drinking? 0 Filed at: 11/02/2023 2117   3a. Male UNDER 65: How often do you have five or more drinks on one occasion? 0 Filed at: 11/02/2023 2117   3b. FEMALE Any Age, or MALE 65+: How often do you have 4 or more drinks on one occassion? 0 Filed at: 11/02/2023 2117   Audit-C Score 0 Filed at: 11/02/2023 2117   MELCHOR: How many times in the past year have you. .. Used an illegal drug or used a prescription medication for non-medical reasons? Never Filed at: 11/02/2023 2117                  Medical Decision Making  DDx: Palpitations of arrhythmia origin, metabolic disturbance, anemia, blood clot, will screen loop recorder interrogation for signs of severe arrhythmia, will screen EKG for intervals and changes from prior, will screen blood work. We will keep patient on cardiac telemetry while present here in the emergency department. Possible recrudescence of symptoms since stopping metoprolol last week. Reassessment: Patient now complaining of chest pain, will receive chest pain work-up including troponin and EKG. Patient also has electrolyte abnormalities and hypokalemia and hypomagnesemia, both repleted orally. Loop recorder thankfully did not demonstrate any acute abnormalities that would explain patient's symptoms today. Likely this is representative of a return of his chronic symptoms since stopping metoprolol last week. Disposition: Patient will be discharged with follow-up with cardiology for his palpitations that have recurred since stopping metoprolol. He will come back to the emergency department if there is any acute worsening of his symptoms especially if he feels acutely short of breath or severe chest pain. Amount and/or Complexity of Data Reviewed  Labs: ordered. Radiology: ordered. Risk  Prescription drug management.           Disposition  Final diagnoses:   Palpitations   Tachycardia   Dyspnea     Time reflects when diagnosis was documented in both MDM as applicable and the Disposition within this note       Time User Action Codes Description Comment    11/2/2023 10:17 PM Kenard Romberg Add [R00.2] Palpitations     11/2/2023 10:17 PM Kenfletcher Perezberg Add [R00.0] Tachycardia     11/2/2023 10:17 PM Kenard Romberg Add [R06.00] Dyspnea           ED Disposition       ED Disposition   Discharge    Condition   Stable    Date/Time   Thu Nov 2, 2023 2217    [address] [facility] discharge to home/self care. Follow-up Information       Follow up With Specialties Details Why Contact Info Additional DO [facility]   [address] Emergency Department Emergency Medicine Go to  If symptoms worsen, As needed [address] [address]  [facility] Emergency Department, [address], [place], [facility] JAMES Burden Cardiology, Physician Assistant  call in morning 1 [facility]  [phone]               Discharge Medication List as of 11/2/2023 11:49 PM        CONTINUE these medications which have NOT CHANGED    Details   albuterol (ProAir HFA) 90 mcg/act inhaler Inhale 2 puffs every 6 (six) hours as needed for wheezing, Starting Mon 8/21/2023, Normal      Budeson-Glycopyrrol-Formoterol (Breztri Aerosphere) 160-9-4.8 MCG/ACT AERO Inhale 2 puffs 2 (two) times a day as needed (as needed) Rinse mouth after use., Starting Mon 8/21/2023, Normal      cetirizine (ZyrTEC) 10 mg tablet Take 10 mg by mouth in the morning., Historical Med      metoprolol succinate (TOPROL-XL) 25 mg 24 hr tablet TAKE 1 TABLET (25 MG TOTAL) BY MOUTH DAILY. , Starting Fri 10/20/2023, Normal      Multiple Vitamin (ONE-A-DAY MENS PO) Take by mouth, Historical Med               PDMP Review       None             ED Provider  Attending physically available and evaluated Beatrice Whiteside. I managed the patient along with the ED Attending.     Electronically Signed by           Pavel Bahena MD  11/03/23 6446

## 2023-11-03 NOTE — TELEPHONE ENCOUNTER
Spoke with pt per Nini. Pt will begin taking metoprolol succinate 25mg daily at night. Pt is aware to give us a call if he experiences any symptoms. Thank you!

## 2023-11-03 NOTE — ED ATTENDING ATTESTATION
11/2/2023  ITashi, DO, saw and evaluated the patient. I have discussed the patient with the resident/non-physician practitioner and agree with the resident's/non-physician practitioner's findings, Plan of Care, and MDM as documented in the resident's/non-physician practitioner's note, except where noted. All available labs and Radiology studies were reviewed. I was present for key portions of any procedure(s) performed by the resident/non-physician practitioner and I was immediately available to provide assistance. At this point I agree with the current assessment done in the Emergency Department. I have conducted an independent evaluation of this patient a history and physical is as follows:  42-year-old male presents for palpitations shortness of breath. Patient has a history of possible SVT  Wide-complex tachycardia on Apple Watch few months ago has a loop recorder that has not shown any arrhythmias. He denies any chest pain initially but on arrival to the emergency department developed chest pain. Normal exam currently. EKG interpreted by me as normal sinus rhythm.   Plan cardiac work-up rule out PE differential includes stress ACS arrhythmia    ED Course         Critical Care Time  Procedures

## 2023-11-03 NOTE — DISCHARGE INSTRUCTIONS
You need to call your cardiology office in the morning to talk about whether or not you should restart metoprolol. If you have sudden worsening of your symptoms then please come back to the ED for evaluation. Because of the chest pain you are having tonight please follow-up with cardiology in the morning for both chest pain and your palpitations.

## 2023-11-03 NOTE — ED PROVIDER NOTES
Emergency Department Sign Out Note        Sign out and transfer of care from Dr. Matt Jaimes. See Separate Emergency Department note. The patient, Christian Castillo, was evaluated by the previous provider for palpitations and SOB. Workup Completed:  Labs  EKG    ED Course / Workup Pending (followup): Troponin  CXR                                  ED Course as of 11/03/23 0029   u Nov 02, 2023   2220 SO: ho palpitations here for same and SOB. Workup negative. Had CP after taking mag ox, pending delta trop and CXR. If negative, dc with cardiology follow up   2342 hs TnI 0hr: 2   2349 Per my wet read of chest x-ray there is no acute cardiopulmonary process. 2352 Stable for discharge. Strict return to ED precautions provided. Advised to follow up with cardiologist as soon as possible for re-evaluation and further management. Cardiology referral provided. Patient verbalized understanding and agrees with the plan of care. Procedures  Medical Decision Making  Amount and/or Complexity of Data Reviewed  Labs: ordered. Decision-making details documented in ED Course. Radiology: ordered. Risk  OTC drugs. Prescription drug management. See ED course above for additional details including HPI, MDM including PLAN, and disposition. Disposition  Final diagnoses:   Palpitations   Tachycardia   Dyspnea     Time reflects when diagnosis was documented in both MDM as applicable and the Disposition within this note       Time User Action Codes Description Comment    11/2/2023 10:17 PM Ardelia Angelucci Add [R00.2] Palpitations     11/2/2023 10:17 PM Ardelia Angelucci Add [R00.0] Tachycardia     11/2/2023 10:17 PM Ardelia Angelucci Add [R06.00] Dyspnea           ED Disposition       ED Disposition   Discharge    Condition   Stable    Date/Time   u Nov 2, 2023 10:17 PM    [address] discharge to home/self care.                    Follow-up Information       Follow up With Specialties Details Why Contact Info Additional Information    Kemi Hernandez DO Family Medicine   [address] [facility] Emergency Department Emergency Medicine Go to  If symptoms worsen, As needed [address] [address]  [facility] Emergency Department, [address], [place], [place], [address] JAMES Burden Cardiology, Physician Assistant  call in morning 1 [facility]  [phone]             Discharge Medication List as of 11/2/2023 11:49 PM        CONTINUE these medications which have NOT CHANGED    Details   albuterol (ProAir HFA) 90 mcg/act inhaler Inhale 2 puffs every 6 (six) hours as needed for wheezing, Starting Mon 8/21/2023, Normal      Budeson-Glycopyrrol-Formoterol (Breztri Aerosphere) 160-9-4.8 MCG/ACT AERO Inhale 2 puffs 2 (two) times a day as needed (as needed) Rinse mouth after use., Starting Mon 8/21/2023, Normal      cetirizine (ZyrTEC) 10 mg tablet Take 10 mg by mouth in the morning., Historical Med      metoprolol succinate (TOPROL-XL) 25 mg 24 hr tablet TAKE 1 TABLET (25 MG TOTAL) BY MOUTH DAILY. , Starting Fri 10/20/2023, Normal      Multiple Vitamin (ONE-A-DAY MENS PO) Take by mouth, Historical Med                  ED Provider  Electronically Signed by     Eileen Wilkerson MD  11/03/23 2700

## 2023-11-16 ENCOUNTER — REMOTE DEVICE CLINIC VISIT (OUTPATIENT)
Dept: CARDIOLOGY CLINIC | Facility: CLINIC | Age: 23
End: 2023-11-16
Payer: COMMERCIAL

## 2023-11-16 DIAGNOSIS — Z95.818 PRESENCE OF OTHER CARDIAC IMPLANTS AND GRAFTS: Primary | ICD-10-CM

## 2023-11-16 PROCEDURE — G2066 INTER DEVC REMOTE 30D: HCPCS | Performed by: INTERNAL MEDICINE

## 2023-11-16 PROCEDURE — 93298 REM INTERROG DEV EVAL SCRMS: CPT | Performed by: INTERNAL MEDICINE

## 2023-11-16 NOTE — PROGRESS NOTES
MDT LNQ22/ACTIVE SYSTEM IS MRI CONDITIONAL   CARELINK TRANSMISSION: LOOP RECORDER. PRESENTING RHYTHM VS @ 90 BPM. BATTERY STATUS "OK." NO PATIENT OR DEVICE ACTIVATED EPISODES. NORMAL DEVICE FUNCTION.  DL

## 2023-11-17 ENCOUNTER — OFFICE VISIT (OUTPATIENT)
Dept: CARDIOLOGY CLINIC | Facility: CLINIC | Age: 23
End: 2023-11-17
Payer: COMMERCIAL

## 2023-11-17 VITALS
HEART RATE: 68 BPM | SYSTOLIC BLOOD PRESSURE: 114 MMHG | BODY MASS INDEX: 32.63 KG/M2 | HEIGHT: 69 IN | DIASTOLIC BLOOD PRESSURE: 86 MMHG | WEIGHT: 220.3 LBS

## 2023-11-17 DIAGNOSIS — R00.0 TACHYCARDIA: ICD-10-CM

## 2023-11-17 DIAGNOSIS — K21.9 GASTROESOPHAGEAL REFLUX DISEASE WITHOUT ESOPHAGITIS: ICD-10-CM

## 2023-11-17 DIAGNOSIS — R00.2 PALPITATIONS: Primary | ICD-10-CM

## 2023-11-17 PROCEDURE — 99215 OFFICE O/P EST HI 40 MIN: CPT | Performed by: INTERNAL MEDICINE

## 2023-11-17 PROCEDURE — 93000 ELECTROCARDIOGRAM COMPLETE: CPT | Performed by: INTERNAL MEDICINE

## 2023-11-17 RX ORDER — OMEPRAZOLE 20 MG/1
20 TABLET, DELAYED RELEASE ORAL DAILY
Qty: 30 TABLET | Refills: 3 | Status: SHIPPED | OUTPATIENT
Start: 2023-11-17

## 2023-11-17 NOTE — PROGRESS NOTES
Cardiology Follow Up    [facility]  2000  [id number]  [facility] [id number] [phone]    ASSESSMENT/PLAN:    Wide-complex tachycardia  Patient had presented due to hospital with wide-complex tachycardia noted on Apple Watch  Echocardiogram showed preserved EF and stress test was negative for ischemia as well as a Melia Flores C  Cardiac MRI was negative as well  EP study versus loop implantation was discussed and he opted to proceed with loop monitor placement  Patient has been having intermittent symptomatic PACs and sinus tachycardia as well as possible SVT on the loop monitor  He is currently taking metoprolol 25 mg daily  Went to the emergency room recently for palpitation and shortness of breath  Loop monitor has shown sinus tachycardia versus atrial tachycardia  Patient reports symptoms are worse with large meal but better with small meals; also feels acid reflux at time  Discussed starting AAD vs. Treatment of GERD  We decided to treat GERD with omeprazole 20 mg once daily; if not improved patient will call us to start flecainide 100 mg twice daily  Continue metoprolol  Obesity  BMI of 34  Recommended diet and exercise  Asthma  On albuterol inhaler as needed and another inhaler as needed as well  Not using them  GERD  Does report having acid reflux  Discussed started treatment and he is agreeable  Will start omeprazole 20 mg daily to see if improves his symptoms. HPI:    Edinson Thompson is a 20 y.o. male with asthma, obesity who present for follow-up visit for palpitations. Patient was evaluated in the hospital in August with wide-complex tachycardia noted on the Apple Watch. His work-up was negative including echocardiogram, stress test and cardiac MRI. He underwent loop monitor implantation which showed possible atrial tachycardia versus SVT.   He was started on metoprolol XL 25 mg daily and has noted improvement but continues to have symptoms. He reports having shortness of breath either shortly after palpitations or at the same time as palpitations. He notes episodes last several hours. It can with sitting or during driving. He feels uncomfortable and has gone to the emergency room as recently as November 2, 2023. Work-up at that time was negative. He also reports having improvement in his symptoms with smaller meals. He does have symptoms of heartburn but has not trialed any over-the-counter antiacid medications. He also has inhalers for asthma but has not used them. He notes his heart rate is in 130s at times. It does not increase beyond 150 bpm.    He otherwise denies any swelling in lower extremity, orthopnea, PND or syncope. Patient Active Problem List   Diagnosis    Mild intermittent reactive airway disease without complication    Palpitations    Wide-complex tachycardia    Status post Medtronic loop recorder 8/3/2023     No past medical history on file.   Social History     Socioeconomic History    Marital status: Single     Spouse name: Not on file    Number of children: Not on file    Years of education: Not on file    Highest education level: Not on file   Occupational History    Not on file   Tobacco Use    Smoking status: Never    Smokeless tobacco: Never   Vaping Use    Vaping Use: Never used   Substance and Sexual Activity    Alcohol use: Not Currently    Drug use: Never    Sexual activity: Not on file   Other Topics Concern    Not on file   Social History Narrative    Not on file     Social Determinants of Health     Financial Resource Strain: Not on file   Food Insecurity: No Food Insecurity (8/2/2023)    Hunger Vital Sign     Worried About Running Out of Food in the Last Year: Never true     Ran Out of Food in the Last Year: Never true   Transportation Needs: No Transportation Needs (8/2/2023)    PRAPARE - Transportation     Lack of Transportation (Medical): No     Lack of Transportation (Non-Medical): No   Physical Activity: Not on file   Stress: Not on file   Social Connections: Not on file   Intimate Partner Violence: Not on file   Housing Stability: Unknown (8/2/2023)    Housing Stability Vital Sign     Unable to Pay for Housing in the Last Year: No     Number of Places Lived in the Last Year: Not on file     Unstable Housing in the Last Year: Not on file      Family History   Problem Relation Age of Onset    Cardiomyopathy Father      Past Surgical History:   Procedure Laterality Date    CARDIAC ELECTROPHYSIOLOGY PROCEDURE N/A 8/3/2023    Procedure: Cardiac loop recorder implant;  Surgeon: Karina Kwong MD;  [facility]; Service: Cardiology       Current Outpatient Medications:     albuterol (ProAir HFA) 90 mcg/act inhaler, Inhale 2 puffs every 6 (six) hours as needed for wheezing, Disp: 18 g, Rfl: 3    Budeson-Glycopyrrol-Formoterol (Breztri Aerosphere) 160-9-4.8 MCG/ACT AERO, Inhale 2 puffs 2 (two) times a day as needed (as needed) Rinse mouth after use., Disp: 10.7 g, Rfl: 5    cetirizine (ZyrTEC) 10 mg tablet, Take 10 mg by mouth in the morning., Disp: , Rfl:     metoprolol succinate (TOPROL-XL) 25 mg 24 hr tablet, TAKE 1 TABLET (25 MG TOTAL) BY MOUTH DAILY. , Disp: 90 tablet, Rfl: 1    Multiple Vitamin (ONE-A-DAY MENS PO), Take by mouth, Disp: , Rfl:   Allergies   Allergen Reactions    Penicillins Rash       Imaging: Cardiac EP device report    Result Date: 11/16/2023  Narrative: CRISTO CADENA/ACTIVE SYSTEM IS MRI CONDITIONAL CARELINK TRANSMISSION: LOOP RECORDER. PRESENTING RHYTHM VS @ 90 BPM. BATTERY STATUS "OK." NO PATIENT OR DEVICE ACTIVATED EPISODES. NORMAL DEVICE FUNCTION. DL     Cardiac EP device report    Result Date: 11/15/2023  Narrative: CRISTO CADENA/ACTIVE SYSTEM IS MRI CONDITIONAL CARELINK TRANSMISSION: ALERT--2 PATIENT ACTIVATED EPISODE.  Symptom Details: Dizzy / Lightheaded, Short of breath, Heart pounding, Faint  Not at all active  Sitting in car while waiting for heart rate to go down; Symptom Details: Heart pounding, Other  A little active  Driving when heart rate suddenly spiked, EGM SHOWS NSR and ST.---[place]     Cardiac EP device report    Result Date: 11/7/2023  Narrative: CRISTO CADENA/ACTIVE SYSTEM IS MRI CONDITIONAL NON-BILLABLE. CARELINK TRANSMISSION: ALERT/SYMPTOM. PER PT: Short of breath, Heart pounding  Not at all active  Was sitting at my desk when shortness of breath came and then a fast heart rate. EGRAM SHOWS ST, ST PACs AND PAT. DL     X-ray chest 1 view portable    Result Date: 11/3/2023  Narrative: CHEST INDICATION:   chest pain. COMPARISON: 8/1/2023 EXAM PERFORMED/VIEWS:  XR CHEST PORTABLE Single view FINDINGS: Cardiac loop recorder has been placed Cardiomediastinal silhouette appears unremarkable. The lungs are clear. No pneumothorax or pleural effusion. Osseous structures appear within normal limits for patient age. Impression: No acute cardiopulmonary disease. Stable except for loop recorder placement Workstation performed: [id number]     Cardiac EP device report    Result Date: 11/3/2023  Narrative: CRISTO CADENA/ACTIVE SYSTEM IS MRI CONDITIONAL NON-BILLABLE. CARELINK TRANSMISSION: ALERT/SYMPTOM. PER PT: #15 Heart pounding, Other  A little active  Was driving when heart rate was suddenly very high. EGRAM SHOWS SR, ST , PACs AND PAT. # 16 Short of breath, Heart pounding, Other  Not at all active  Sitting in car waiting for heart rate to go down. Some shortness of breath now. EGRAM SHOWS SR,ST AND PACs. DL     Cardiac EP device report    Result Date: 10/27/2023  Narrative: CRISTO CADENA/ACTIVE SYSTEM IS MRI CONDITIONAL NON-BILLABLE. CARELINK TRANSMISSION: ALERT/SYMPTOM. PER PT: Heart pounding, Heart fluttering  Not at all active  Was about to fall asleep when heart felt fluttery. EGRAM SHOWS SB, SR, ST, PACs.  DL     Cardiac EP device report    Result Date: 10/20/2023  Narrative: CRISTO LNQ22/ACTIVE SYSTEM IS MRI CONDITIONAL NON-BILLABLE. CARELINK TRANSMISSION: ALERT/SYMPTOM. PER PT: Short of breath, Heart pounding, Other  A little active  Sitting at my computer desk with a fast heart rate despite being seated for a while. EGRAM SHOWS SR, ST AND PACs. DL         Review of Systems:  Review of Systems   Constitutional:  Negative for chills, fatigue and fever. HENT: Negative. Eyes: Negative. Negative for discharge. Respiratory: Negative. Negative for chest tightness, shortness of breath and wheezing. Cardiovascular:  Positive for chest pain (See HPI) and palpitations. Negative for leg swelling. Gastrointestinal: Negative. Negative for abdominal pain, nausea and vomiting. Endocrine: Negative. Genitourinary: Negative. Musculoskeletal: Negative. Negative for arthralgias. Skin: Negative. Negative for rash. Allergic/Immunologic: Negative. Neurological: Negative. Negative for dizziness and light-headedness. Hematological: Negative. Psychiatric/Behavioral: Negative. Objective:   /86 (BP Location: Right arm, Patient Position: Sitting, Cuff Size: Large)   Pulse 68   Ht 5' 9" (1.753 m)   Wt 99.9 kg (220 lb 4.8 oz)   BMI 32.53 kg/m²    Physical Exam:  GEN: NAD, Alert and oriented, well appearing  HEENT:Head, neck, ears, oral pharynx: Mucus membranes moist, oral pharynx clear, nares clear. External ears normal  EYES: Pupils equal, sclera anicteric  NECK: No JVD  CARDIOVASCULAR: RRR, No murmur, rub, gallops S1,S2  LUNGS: Clear To auscultation bilaterally  ABDOMEN: Soft, nondistended  EXTREMITIES/VASCULAR: No edema.   PSYCH: Normal Affect  NEURO: Grossly intact, moving all extremiteis equal, face symetric  HEME: No bleeding, bruising, petechia  SKIN: No significant rashes       Labs & Results:  Below is the patient's most recent value for Albumin, ALT, AST, BUN, Calcium, Chloride, Cholesterol, CO2, Creatinine, GFR, Glucose, HDL, Hematocrit, Hemoglobin, Hemoglobin A1C, LDL, Magnesium, Phosphorus, Platelets, Potassium, PSA, Sodium, Triglycerides, and WBC. Lab Results   Component Value Date    ALT 50 08/01/2023    AST 17 08/01/2023    BUN 8 11/02/2023    CALCIUM 9.6 11/02/2023     11/02/2023    CO2 24 11/02/2023    CREATININE 0.63 11/02/2023    HCT 47.5 11/02/2023    HGB 16.3 11/02/2023    MG 1.8 (L) 11/02/2023     11/02/2023    K 3.3 (L) 11/02/2023    WBC 12.03 (H) 11/02/2023     Note: for a comprehensive list of the patient's lab results, access the Results Review activity. Cardiac testing:     I personally reviewed the ECG performed in the clinic on 11/17/23. It reveals normal sinus rhythm with sinus arrhythmia. Echocardiograms:  No results found for this or any previous visit. No results found for this or any previous visit. Catheterizations:   No results found for this or any previous visit. Stress Tests:  No results found for this or any previous visit. Holter monitor -   No results found for this or any previous visit. No results found for this or any previous visit.

## 2023-11-17 NOTE — PATIENT INSTRUCTIONS
Start taking omeprazole 20 mg once daily     Try taking omeprazole for 2 weeks and if it does not help then call us.  We will start you on rhythm medication flecainide 100 mg twice daily

## 2024-02-08 ENCOUNTER — OFFICE VISIT (OUTPATIENT)
Dept: CARDIOLOGY CLINIC | Facility: CLINIC | Age: 24
End: 2024-02-08
Payer: COMMERCIAL

## 2024-02-08 VITALS
HEIGHT: 69 IN | SYSTOLIC BLOOD PRESSURE: 124 MMHG | BODY MASS INDEX: 31.8 KG/M2 | DIASTOLIC BLOOD PRESSURE: 90 MMHG | WEIGHT: 214.7 LBS | HEART RATE: 72 BPM

## 2024-02-08 DIAGNOSIS — R00.2 PALPITATIONS: Primary | ICD-10-CM

## 2024-02-08 PROCEDURE — 93000 ELECTROCARDIOGRAM COMPLETE: CPT | Performed by: INTERNAL MEDICINE

## 2024-02-08 PROCEDURE — 99215 OFFICE O/P EST HI 40 MIN: CPT | Performed by: INTERNAL MEDICINE

## 2024-02-08 NOTE — PROGRESS NOTES
Cardiology Follow Up    Den Gonzalez  2000  [id number]  [facility] [place]  [address]  [place] [address] [phone]    ASSESSMENT/PLAN:    Wide-complex tachycardia  Patient had presented due to hospital with wide-complex tachycardia noted on Apple Watch  Echocardiogram showed preserved EF and stress test was negative for ischemia as well as a Fontaine C  Cardiac MRI was negative as well  EP study versus loop implantation was discussed and he opted to proceed with loop monitor placement  Patient has been having intermittent symptomatic PACs and sinus tachycardia as well as possible SVT on the loop monitor  He is currently taking metoprolol 25 mg daily  Went to the emergency room recently for palpitation and shortness of breath  Loop monitor has shown sinus tachycardia versus atrial tachycardia  Patient reports symptoms are worse with large meal but better with small meals; also feels acid reflux at time  Discussed starting AAD vs. Treatment of GERD  Notes improvement in palpitations. Still notes them occurring daily, lasting 1 min to 20 min; no associated symptoms; heart rate around 120 bpm  No episodes noted on loop   Will increase metoprolol to 50 mg daily   Obesity  BMI of 31  Recommended diet and exercise  Asthma  On albuterol inhaler as needed and another inhaler as needed as well  Not using them  GERD  Does report having acid reflux  Discussed started treatment and he is agreeable  Started on omeprazole 20 mg daily  Patient is taking as needed and notes improvement      HPI:    Den Gonzalez is a 23 y.o. male with asthma, obesity who present for follow-up visit for palpitations.    Patient was evaluated in the hospital in August with wide-complex tachycardia noted on the Apple Watch.  His work-up was negative including echocardiogram, stress test and cardiac MRI.  He underwent loop monitor implantation  which showed possible atrial tachycardia versus SVT.  He was started on metoprolol XL 25 mg daily and has noted improvement but continues to have symptoms.    He reported having shortness of breath either shortly after palpitations or at the same time as palpitations.  He noted episodes lasted several hours.  It would with sitting or during driving.  He felt uncomfortable and had gone to the emergency room as recently as November 2, 2023.  Work-up at that time was negative.    He also reported having improvement in his symptoms with smaller meals.  He did have symptoms of heartburn but had not trialed any over-the-counter antiacid medications.    He also has inhalers for asthma but has not used them.    He noted his heart rate were in 130s at times.  It did not increase beyond 150 bpm.    He otherwise denied any swelling in lower extremity, orthopnea, PND or syncope.    Interval history:  Den now presents for a follow-up visit.  He was started on metoprolol 25 mg daily after the last visit.  He notes his palpitations have improved though still occur on a daily basis.  Heart rate is usually in 120s.  He does not feel any symptoms associated with palpitations.  He does get dizziness at times even if he is sitting or standing but is not occurring at the time of palpitations.  He attributes dizziness is not significant.  He does not check his blood pressure at home.  He occasionally does feel fatigue.  He is still avoiding caffeine and alcohol intake.  His acid reflux has improved.    Patient Active Problem List   Diagnosis    Mild intermittent reactive airway disease without complication    Palpitations    Wide-complex tachycardia    Status post Medtronic loop recorder 8/3/2023     History reviewed. No pertinent past medical history.  Social History     Socioeconomic History    Marital status: Single     Spouse name: Not on file    Number of children: Not on file    Years of education: Not on file    Highest  education level: Not on file   Occupational History    Not on file   Tobacco Use    Smoking status: Never    Smokeless tobacco: Never   Vaping Use    Vaping status: Never Used   Substance and Sexual Activity    Alcohol use: Not Currently    Drug use: Never    Sexual activity: Not on file   Other Topics Concern    Not on file   Social History Narrative    Not on file     Social Determinants of Health     Financial Resource Strain: Not on file   Food Insecurity: No Food Insecurity (8/2/2023)    Hunger Vital Sign     Worried About Running Out of Food in the Last Year: Never true     Ran Out of Food in the Last Year: Never true   Transportation Needs: No Transportation Needs (8/2/2023)    PRAPARE - Transportation     Lack of Transportation (Medical): No     Lack of Transportation (Non-Medical): No   Physical Activity: Not on file   Stress: Not on file   Social Connections: Not on file   Intimate Partner Violence: Not on file   Housing Stability: Unknown (8/2/2023)    Housing Stability Vital Sign     Unable to Pay for Housing in the Last Year: No     Number of Places Lived in the Last Year: Not on file     Unstable Housing in the Last Year: Not on file      Family History   Problem Relation Age of Onset    Cardiomyopathy Father      Past Surgical History:   Procedure Laterality Date    CARDIAC ELECTROPHYSIOLOGY PROCEDURE N/A 8/3/2023    Procedure: Cardiac loop recorder implant;  Surgeon: Gavin Thomas MD;  [facility];  Service: Cardiology       Current Outpatient Medications:     albuterol (ProAir HFA) 90 mcg/act inhaler, Inhale 2 puffs every 6 (six) hours as needed for wheezing, Disp: 18 g, Rfl: 3    cetirizine (ZyrTEC) 10 mg tablet, Take 10 mg by mouth in the morning., Disp: , Rfl:     metoprolol succinate (TOPROL-XL) 25 mg 24 hr tablet, TAKE 1 TABLET (25 MG TOTAL) BY MOUTH DAILY., Disp: 90 tablet, Rfl: 1    Multiple Vitamin (ONE-A-DAY MENS PO), Take by mouth, Disp: , Rfl:     omeprazole (PriLOSEC  "OTC) 20 MG tablet, Take 1 tablet (20 mg total) by mouth daily (Patient taking differently: Take 20 mg by mouth daily as needed), Disp: 30 tablet, Rfl: 3    Budeson-Glycopyrrol-Formoterol (Breztri Aerosphere) 160-9-4.8 MCG/ACT AERO, Inhale 2 puffs 2 (two) times a day as needed (as needed) Rinse mouth after use. (Patient not taking: Reported on 2/8/2024), Disp: 10.7 g, Rfl: 5  Allergies   Allergen Reactions    Penicillins Rash       Imaging: Cardiac EP device report    Result Date: 11/16/2023  Narrative: CRISTO CADENA/ACTIVE SYSTEM IS MRI CONDITIONAL CARELINK TRANSMISSION: LOOP RECORDER. PRESENTING RHYTHM VS @ 90 BPM. BATTERY STATUS \"OK.\" NO PATIENT OR DEVICE ACTIVATED EPISODES. NORMAL DEVICE FUNCTION. DL     Cardiac EP device report    Result Date: 11/15/2023  Narrative: CRISTO CADENA/ACTIVE SYSTEM IS MRI CONDITIONAL CARELINK TRANSMISSION: ALERT--2 PATIENT ACTIVATED EPISODE. Symptom Details: Dizzy / Lightheaded, Short of breath, Heart pounding, Faint  Not at all active  Sitting in car while waiting for heart rate to go down; Symptom Details: Heart pounding, Other  A little active  Driving when heart rate suddenly spiked, EGM SHOWS NSR and ST.---[place]     Cardiac EP device report    Result Date: 11/7/2023  Narrative: CRISTO CADENA/ACTIVE SYSTEM IS MRI CONDITIONAL NON-BILLABLE. CARELINK TRANSMISSION: ALERT/SYMPTOM. PER PT: Short of breath, Heart pounding  Not at all active  Was sitting at my desk when shortness of breath came and then a fast heart rate. EGRAM SHOWS ST, ST PACs AND PAT. DL     X-ray chest 1 view portable    Result Date: 11/3/2023  Narrative: CHEST INDICATION:   chest pain. COMPARISON: 8/1/2023 EXAM PERFORMED/VIEWS:  XR CHEST PORTABLE Single view FINDINGS: Cardiac loop recorder has been placed Cardiomediastinal silhouette appears unremarkable. The lungs are clear.  No pneumothorax or pleural effusion. Osseous structures appear within normal limits for patient age.     Impression: No acute cardiopulmonary " disease. Stable except for loop recorder placement Workstation performed: [id number]     Cardiac EP device report    Result Date: 11/3/2023  Narrative: CRISTO LNQ22/ACTIVE SYSTEM IS MRI CONDITIONAL NON-BILLABLE. CARELINK TRANSMISSION: ALERT/SYMPTOM. PER PT: #15 Heart pounding, Other  A little active  Was driving when heart rate was suddenly very high. EGRAM SHOWS SR, ST , PACs AND PAT. # 16 Short of breath, Heart pounding, Other  Not at all active  Sitting in car waiting for heart rate to go down. Some shortness of breath now. EGRAM SHOWS SR,ST AND PACs. DL     Cardiac EP device report    Result Date: 10/27/2023  Narrative: CRISTO LNQ22/ACTIVE SYSTEM IS MRI CONDITIONAL NON-BILLABLE. CARELINK TRANSMISSION: ALERT/SYMPTOM. PER PT: Heart pounding, Heart fluttering  Not at all active  Was about to fall asleep when heart felt fluttery. EGRAM SHOWS SB, SR, ST, PACs. DL     Cardiac EP device report    Result Date: 10/20/2023  Narrative: CRISTO VENCES2/ACTIVE SYSTEM IS MRI CONDITIONAL NON-BILLABLE. CARELINK TRANSMISSION: ALERT/SYMPTOM. PER PT: Short of breath, Heart pounding, Other  A little active  Sitting at my computer desk with a fast heart rate despite being seated for a while. EGRAM SHOWS SR, ST AND PACs. DL         Review of Systems:  Review of Systems   Constitutional:  Positive for fatigue. Negative for chills and fever.   HENT: Negative.     Eyes: Negative.  Negative for discharge.   Respiratory: Negative.  Negative for chest tightness, shortness of breath and wheezing.    Cardiovascular:  Positive for palpitations. Negative for chest pain (See HPI) and leg swelling.   Gastrointestinal: Negative.  Negative for abdominal pain, nausea and vomiting.   Endocrine: Negative.    Genitourinary: Negative.    Musculoskeletal: Negative.  Negative for arthralgias.   Skin: Negative.  Negative for rash.   Allergic/Immunologic: Negative.    Neurological:  Positive for dizziness.   Hematological: Negative.    Psychiatric/Behavioral:  "Negative.       Objective:   /90 (BP Location: Right arm, Patient Position: Sitting, Cuff Size: Standard)   Pulse 72   Ht 5' 9\" (1.753 m)   Wt 97.4 kg (214 lb 11.2 oz)   BMI 31.71 kg/m²    Physical Exam:  GEN: NAD, Alert and oriented, well appearing  HEENT:Head, neck, ears, oral pharynx: Mucus membranes moist, oral pharynx clear, nares clear. External ears normal  EYES: Pupils equal, sclera anicteric  NECK: No JVD  CARDIOVASCULAR: RRR, No murmur, rub, gallops S1,S2  LUNGS: Clear To auscultation bilaterally  ABDOMEN: Soft, nondistended  EXTREMITIES/VASCULAR: No edema.  PSYCH: Normal Affect  NEURO: Grossly intact, moving all extremiteis equal, face symetric  HEME: No bleeding, bruising, petechia  SKIN: No significant rashes       Labs & Results:  Below is the patient's most recent value for Albumin, ALT, AST, BUN, Calcium, Chloride, Cholesterol, CO2, Creatinine, GFR, Glucose, HDL, Hematocrit, Hemoglobin, Hemoglobin A1C, LDL, Magnesium, Phosphorus, Platelets, Potassium, PSA, Sodium, Triglycerides, and WBC.   Lab Results   Component Value Date    ALT 50 08/01/2023    AST 17 08/01/2023    BUN 8 11/02/2023    CALCIUM 9.6 11/02/2023     11/02/2023    CO2 24 11/02/2023    CREATININE 0.63 11/02/2023    HCT 47.5 11/02/2023    HGB 16.3 11/02/2023    MG 1.8 (L) 11/02/2023     11/02/2023    K 3.3 (L) 11/02/2023    WBC 12.03 (H) 11/02/2023     Note: for a comprehensive list of the patient's lab results, access the Results Review activity.          Cardiac testing:     I personally reviewed the ECG performed in the clinic on 02/08/24. It reveals normal sinus rhythm.    Echocardiograms:  No results found for this or any previous visit.    No results found for this or any previous visit.      Catheterizations:   No results found for this or any previous visit.      Stress Tests:  No results found for this or any previous visit.      Holter monitor -   No results found for this or any previous visit.    No " results found for this or any previous visit.

## 2024-02-15 ENCOUNTER — REMOTE DEVICE CLINIC VISIT (OUTPATIENT)
Dept: CARDIOLOGY CLINIC | Facility: CLINIC | Age: 24
End: 2024-02-15
Payer: COMMERCIAL

## 2024-02-15 DIAGNOSIS — Z95.818 PRESENCE OF CARDIAC DEVICE: Primary | ICD-10-CM

## 2024-02-15 PROCEDURE — 93298 REM INTERROG DEV EVAL SCRMS: CPT | Performed by: STUDENT IN AN ORGANIZED HEALTH CARE EDUCATION/TRAINING PROGRAM

## 2024-02-15 NOTE — PROGRESS NOTES
"Results for orders placed or performed in visit on 02/15/24   Cardiac EP device report    Narrative    MDT LNQ22/ACTIVE SYSTEM IS MRI CONDITIONAL  CARELINK TRANSMISSION: BATTERY STATUS \"OK.\" NO PATIENT OR DEVICE ACTIVATED EPISODES.---[place]        "

## 2024-02-21 ENCOUNTER — RA CDI HCC (OUTPATIENT)
Dept: OTHER | Facility: HOSPITAL | Age: 24
End: 2024-02-21

## 2024-02-21 NOTE — PROGRESS NOTES
HCC coding opportunities          Chart Reviewed number of suggestions sent to Provider: 1     Patients Insurance        Commercial Insurance: [employer] Commercial [employer]       J45.20: Mild intermittent reactive airway disease without complication [[id number]]     Last assessed on 9/27/2023 please review and assess and document per MEAT if applicable for 2024

## 2024-03-06 ENCOUNTER — OFFICE VISIT (OUTPATIENT)
Dept: FAMILY MEDICINE CLINIC | Facility: CLINIC | Age: 24
End: 2024-03-06
Payer: COMMERCIAL

## 2024-03-06 VITALS
BODY MASS INDEX: 31.84 KG/M2 | TEMPERATURE: 98.6 F | OXYGEN SATURATION: 100 % | HEIGHT: 69 IN | DIASTOLIC BLOOD PRESSURE: 78 MMHG | RESPIRATION RATE: 18 BRPM | SYSTOLIC BLOOD PRESSURE: 120 MMHG | WEIGHT: 215 LBS | HEART RATE: 79 BPM

## 2024-03-06 DIAGNOSIS — E78.00 HYPERCHOLESTEREMIA: ICD-10-CM

## 2024-03-06 DIAGNOSIS — Z00.00 ANNUAL PHYSICAL EXAM: Primary | ICD-10-CM

## 2024-03-06 DIAGNOSIS — E83.42 HYPOMAGNESEMIA: ICD-10-CM

## 2024-03-06 DIAGNOSIS — R00.0 WIDE-COMPLEX TACHYCARDIA: ICD-10-CM

## 2024-03-06 PROCEDURE — 99395 PREV VISIT EST AGE 18-39: CPT | Performed by: FAMILY MEDICINE

## 2024-03-06 NOTE — PROGRESS NOTES
ADULT ANNUAL PHYSICAL  [facility]    NAME: Den Gonzalez  AGE: 23 y.o. SEX: male  : 2000     DATE: 3/6/2024     Assessment and Plan:   BMI Counseling: Body mass index is 31.75 kg/m². The BMI is above normal. Nutrition recommendations include reducing portion sizes, consuming healthier snacks, and moderation in carbohydrate intake.  Chief Complaint   Patient presents with    Annual Exam    Palpitations    Wild complex tachycardia      Problem List Items Addressed This Visit       Wide-complex tachycardia     Other Visit Diagnoses       Annual physical exam    -  Primary    Relevant Orders    Hepatitis C antibody    HIV 1/2 AG/AB w Reflex [facility] for 2 yr old and above    Hypomagnesemia        Relevant Orders    Magnesium    Hypercholesteremia        Relevant Orders    Basic metabolic panel    CBC and Platelet    Hepatic function panel            Immunizations and preventive care screenings were discussed with patient today. Appropriate education was printed on patient's after visit summary.    Counseling:  Alcohol/drug use: discussed moderation in alcohol intake, the recommendations for healthy alcohol use, and avoidance of illicit drug use.  Dental Health: discussed importance of regular tooth brushing, flossing, and dental visits.  Injury prevention: discussed safety/seat belts, safety helmets, smoke detectors, carbon dioxide detectors, and smoking near bedding or upholstery.  Sexual health: discussed sexually transmitted diseases, partner selection, use of condoms, avoidance of unintended pregnancy, and contraceptive alternatives.  Exercise: the importance of regular exercise/physical activity was discussed. Recommend exercise 3-5 times per week for at least 30 minutes.          Return in about 7 months (around 10/6/2024).     Chief Complaint:     Chief Complaint   Patient presents with    Annual Exam    Palpitations    Wild complex tachycardia       History of Present Illness:     Adult Annual Physical   Patient here for a comprehensive physical exam. The patient reports no problems.    Diet and Physical Activity  Diet/Nutrition: well balanced diet.   Exercise: no formal exercise.      Depression Screening  PHQ-2/9 Depression Screening    Little interest or pleasure in doing things: 0 - not at all  Feeling down, depressed, or hopeless: 0 - not at all  PHQ-2 Score: 0  PHQ-2 Interpretation: Negative depression screen       General Health  Sleep: gets 4-6 hours of sleep on average.   Hearing: normal - bilateral.  Vision: wears glasses and wears contacts.   Dental: regular dental visits.        Health  History of STDs?: no.    Advanced Care Planning  Do you have an advanced directive? no  Do you have a durable medical power of ? yes  ACP document given to the patient? no     Review of Systems:     Review of Systems   Past Medical History:     History reviewed. No pertinent past medical history.   Past Surgical History:     Past Surgical History:   Procedure Laterality Date    CARDIAC ELECTROPHYSIOLOGY PROCEDURE N/A 8/3/2023    Procedure: Cardiac loop recorder implant;  Surgeon: Gavin Thomas MD;  Location: [facility];  Service: Cardiology      Social History:     Social History     Socioeconomic History    Marital status: Single     Spouse name: None    Number of children: None    Years of education: None    Highest education level: None   Occupational History    None   Tobacco Use    Smoking status: Never    Smokeless tobacco: Never   Vaping Use    Vaping status: Never Used   Substance and Sexual Activity    Alcohol use: Not Currently    Drug use: Never    Sexual activity: Not Currently   Other Topics Concern    None   Social History Narrative    None     Social Determinants of Health     Financial Resource Strain: Not on file   Food Insecurity: No Food Insecurity (8/2/2023)    Hunger Vital Sign     Worried About Running Out of Food in the Last  "Year: Never true     Ran Out of Food in the Last Year: Never true   Transportation Needs: No Transportation Needs (8/2/2023)    PRAPARE - Transportation     Lack of Transportation (Medical): No     Lack of Transportation (Non-Medical): No   Physical Activity: Not on file   Stress: Not on file   Social Connections: Not on file   Intimate Partner Violence: Not on file   Housing Stability: Unknown (8/2/2023)    Housing Stability Vital Sign     Unable to Pay for Housing in the Last Year: No     Number of Places Lived in the Last Year: Not on file     Unstable Housing in the Last Year: Not on file      Family History:     Family History   Problem Relation Age of Onset    Cardiomyopathy Father       Current Medications:     Current Outpatient Medications   Medication Sig Dispense Refill    albuterol (ProAir HFA) 90 mcg/act inhaler Inhale 2 puffs every 6 (six) hours as needed for wheezing 18 g 3    cetirizine (ZyrTEC) 10 mg tablet Take 10 mg by mouth in the morning.      metoprolol succinate (TOPROL-XL) 25 mg 24 hr tablet TAKE 1 TABLET (25 MG TOTAL) BY MOUTH DAILY. 90 tablet 1    Multiple Vitamin (ONE-A-DAY MENS PO) Take by mouth      omeprazole (PriLOSEC OTC) 20 MG tablet Take 1 tablet (20 mg total) by mouth daily (Patient taking differently: Take 20 mg by mouth daily as needed) 30 tablet 3    Budeson-Glycopyrrol-Formoterol (Breztri Aerosphere) 160-9-4.8 MCG/ACT AERO Inhale 2 puffs 2 (two) times a day as needed (as needed) Rinse mouth after use. 10.7 g 5     No current facility-administered medications for this visit.      Allergies:     Allergies   Allergen Reactions    Penicillins Rash      Physical Exam:     /78 (BP Location: Left arm, Patient Position: Sitting, Cuff Size: Large)   Pulse 79   Temp 98.6 °F (37 °C) (Oral)   Resp 18   Ht 5' 9\" (1.753 m)   Wt 97.5 kg (215 lb)   SpO2 100%   BMI 31.75 kg/m²     Physical Exam  Vitals and nursing note reviewed.   Constitutional:       General: He is not in acute " distress.     Appearance: He is well-developed.   HENT:      Head: Normocephalic and atraumatic.      Right Ear: Tympanic membrane, ear canal and external ear normal.      Left Ear: Tympanic membrane, ear canal and external ear normal.      Mouth/Throat:      Mouth: Mucous membranes are moist.      Pharynx: Oropharynx is clear.   Eyes:      Extraocular Movements: Extraocular movements intact.      Conjunctiva/sclera: Conjunctivae normal.      Pupils: Pupils are equal, round, and reactive to light.   Cardiovascular:      Rate and Rhythm: Normal rate and regular rhythm.      Pulses: Normal pulses.      Heart sounds: Normal heart sounds. No murmur heard.  Pulmonary:      Effort: Pulmonary effort is normal. No respiratory distress.      Breath sounds: Normal breath sounds.   Abdominal:      General: Abdomen is flat. Bowel sounds are normal.      Palpations: Abdomen is soft.      Tenderness: There is no abdominal tenderness.   Musculoskeletal:         General: No swelling.      Cervical back: Neck supple.   Skin:     General: Skin is warm and dry.      Capillary Refill: Capillary refill takes less than 2 seconds.   Neurological:      Mental Status: He is alert and oriented to person, place, and time.   Psychiatric:         Mood and Affect: Mood normal.         Behavior: Behavior normal.         Thought Content: Thought content normal.         Judgment: Judgment normal.          Andrei Finch, [facility]

## 2024-03-15 DIAGNOSIS — R00.0 WIDE-COMPLEX TACHYCARDIA: Primary | ICD-10-CM

## 2024-03-15 RX ORDER — METOPROLOL SUCCINATE 50 MG/1
50 TABLET, EXTENDED RELEASE ORAL DAILY
Qty: 90 TABLET | Refills: 1 | Status: SHIPPED | OUTPATIENT
Start: 2024-03-15

## 2024-05-16 ENCOUNTER — REMOTE DEVICE CLINIC VISIT (OUTPATIENT)
Dept: CARDIOLOGY CLINIC | Facility: CLINIC | Age: 24
End: 2024-05-16
Payer: COMMERCIAL

## 2024-05-16 DIAGNOSIS — Z95.818 PRESENCE OF OTHER CARDIAC IMPLANTS AND GRAFTS: Primary | ICD-10-CM

## 2024-05-16 PROCEDURE — 93298 REM INTERROG DEV EVAL SCRMS: CPT | Performed by: STUDENT IN AN ORGANIZED HEALTH CARE EDUCATION/TRAINING PROGRAM

## 2024-05-16 NOTE — PROGRESS NOTES
"MDT LNQ22/ACTIVE SYSTEM IS MRI CONDITIONAL   CARELINK TRANSMISSION: LOOP RECORDER. PRESENTING RHYTHM NSR @ 60 BPM. BATTERY STATUS \"OK.\" NO PATIENT OR DEVICE ACTIVATED EPISODES. NORMAL DEVICE FUNCTION. DL   "

## 2024-05-21 NOTE — Clinical Note
He had preferred to try meds over procedure so we are starting metoprolol, asked him to call us in a week to let us know how he is doing
No

## 2024-08-07 DIAGNOSIS — R00.0 WIDE-COMPLEX TACHYCARDIA: ICD-10-CM

## 2024-08-07 DIAGNOSIS — R00.0 TACHYCARDIA: Primary | ICD-10-CM

## 2024-08-07 RX ORDER — FLECAINIDE ACETATE 100 MG/1
100 TABLET ORAL 2 TIMES DAILY
Qty: 60 TABLET | Refills: 2 | Status: SHIPPED | OUTPATIENT
Start: 2024-08-07

## 2024-08-07 RX ORDER — METOPROLOL SUCCINATE 50 MG/1
25 TABLET, EXTENDED RELEASE ORAL DAILY
Start: 2024-08-07

## 2024-08-15 ENCOUNTER — REMOTE DEVICE CLINIC VISIT (OUTPATIENT)
Dept: CARDIOLOGY CLINIC | Facility: CLINIC | Age: 24
End: 2024-08-15
Payer: COMMERCIAL

## 2024-08-15 DIAGNOSIS — R00.2 PALPITATIONS: Primary | ICD-10-CM

## 2024-08-15 PROCEDURE — 93298 REM INTERROG DEV EVAL SCRMS: CPT | Performed by: INTERNAL MEDICINE

## 2024-08-15 NOTE — PROGRESS NOTES
"MDT LNQ22/ACTIVE SYSTEM IS MRI CONDITIONAL   CARELINK TRANSMISSION: LOOP RECORDER. PRESENTING RHYTHM ST @ 100 BPM. BATTERY STATUS \"OK.\" NO PATIENT OR DEVICE ACTIVATED EPISODES. NORMAL DEVICE FUNCTION. DL   "

## 2024-08-29 DIAGNOSIS — R00.0 TACHYCARDIA: ICD-10-CM

## 2024-08-30 RX ORDER — FLECAINIDE ACETATE 100 MG/1
100 TABLET ORAL 2 TIMES DAILY
Qty: 180 TABLET | Refills: 1 | Status: SHIPPED | OUTPATIENT
Start: 2024-08-30

## 2024-09-06 ENCOUNTER — OFFICE VISIT (OUTPATIENT)
Dept: FAMILY MEDICINE CLINIC | Facility: CLINIC | Age: 24
End: 2024-09-06
Payer: COMMERCIAL

## 2024-09-06 VITALS
WEIGHT: 229.2 LBS | TEMPERATURE: 97.3 F | OXYGEN SATURATION: 99 % | SYSTOLIC BLOOD PRESSURE: 128 MMHG | BODY MASS INDEX: 33.95 KG/M2 | HEIGHT: 69 IN | DIASTOLIC BLOOD PRESSURE: 86 MMHG | HEART RATE: 80 BPM

## 2024-09-06 DIAGNOSIS — R00.2 PALPITATIONS: ICD-10-CM

## 2024-09-06 DIAGNOSIS — Z95.818 IMPLANTABLE LOOP RECORDER PRESENT: ICD-10-CM

## 2024-09-06 DIAGNOSIS — J45.20 MILD INTERMITTENT REACTIVE AIRWAY DISEASE WITHOUT COMPLICATION: Primary | ICD-10-CM

## 2024-09-06 DIAGNOSIS — R00.0 WIDE-COMPLEX TACHYCARDIA: ICD-10-CM

## 2024-09-06 PROCEDURE — 99213 OFFICE O/P EST LOW 20 MIN: CPT | Performed by: FAMILY MEDICINE

## 2024-09-06 NOTE — PROGRESS NOTES
"Ambulatory Visit  Name: Den Gonzalez      : 2000      MRN: [id number]  Encounter Provider: Andrei Finch DO  Encounter Date: 2024   Encounter department: [facility]    Chief Complaint   Patient presents with    Follow-up    Asthma       Assessment & Plan   1. Mild intermittent reactive airway disease without complication  2. Palpitations  3. Wide-complex tachycardia  4. Status post Medtronic loop recorder 8/3/2023       History of Present Illness     RAD controlled, occasional use of inhaler.  Palpations under control.  Has F/U with cardiology this January.        Review of Systems    Objective     /86 (BP Location: Left arm, Patient Position: Sitting, Cuff Size: Standard)   Pulse 80   Temp (!) 97.3 °F (36.3 °C) (Temporal)   Ht 5' 9\" (1.753 m)   Wt 104 kg (229 lb 3.2 oz)   SpO2 99%   BMI 33.85 kg/m²     Physical Exam  Administrative Statements           "

## 2024-09-07 DIAGNOSIS — R00.0 WIDE-COMPLEX TACHYCARDIA: ICD-10-CM

## 2024-09-08 RX ORDER — METOPROLOL SUCCINATE 50 MG/1
50 TABLET, EXTENDED RELEASE ORAL DAILY
Qty: 90 TABLET | Refills: 1 | Status: SHIPPED | OUTPATIENT
Start: 2024-09-08

## 2024-11-14 ENCOUNTER — REMOTE DEVICE CLINIC VISIT (OUTPATIENT)
Dept: CARDIOLOGY CLINIC | Facility: CLINIC | Age: 24
End: 2024-11-14
Payer: COMMERCIAL

## 2024-11-14 ENCOUNTER — RESULTS FOLLOW-UP (OUTPATIENT)
Dept: CARDIOLOGY CLINIC | Facility: CLINIC | Age: 24
End: 2024-11-14

## 2024-11-14 DIAGNOSIS — R00.2 PALPITATIONS: Primary | ICD-10-CM

## 2024-11-14 PROCEDURE — 93298 REM INTERROG DEV EVAL SCRMS: CPT | Performed by: STUDENT IN AN ORGANIZED HEALTH CARE EDUCATION/TRAINING PROGRAM

## 2024-11-14 NOTE — PROGRESS NOTES
"MDT LNQ22/ACTIVE SYSTEM IS MRI CONDITIONAL   CARELINK TRANSMISSION: LOOP RECORDER. PRESENTING RHYTHM ST @ 100 BPM. BATTERY STATUS \"OK.\" 1 PT ACTIVATED SYMPTOM. PER PT: Heart fluttering  Not at all active  Sitting in car with big flutter. EGRAM SHOWS SR W/ PACS. NO DEVICE ACTIVATED EPISODES. NORMAL DEVICE FUNCTION. DL   "

## 2025-01-10 ENCOUNTER — OFFICE VISIT (OUTPATIENT)
Dept: CARDIOLOGY CLINIC | Facility: CLINIC | Age: 25
End: 2025-01-10

## 2025-01-10 VITALS
SYSTOLIC BLOOD PRESSURE: 146 MMHG | WEIGHT: 244 LBS | BODY MASS INDEX: 36.14 KG/M2 | HEART RATE: 66 BPM | HEIGHT: 69 IN | DIASTOLIC BLOOD PRESSURE: 90 MMHG

## 2025-01-10 DIAGNOSIS — R00.2 PALPITATIONS: Primary | ICD-10-CM

## 2025-01-10 NOTE — PROGRESS NOTES
Cardiology Follow Up    Den Gonzalez  2000  [id number]  [facility] [place]  [address]  [place] [address] [phone]    ASSESSMENT/PLAN:    Wide-complex tachycardia  Patient had presented due to hospital with wide-complex tachycardia noted on Apple Watch  Echocardiogram showed preserved EF and stress test was negative for ischemia as well as a [place] C  Cardiac MRI was negative as well  EP study versus loop implantation was discussed and he opted to proceed with loop monitor placement  Patient has been having intermittent symptomatic PACs and sinus tachycardia as well as possible SVT on the loop monitor  He is currently taking metoprolol 25 mg daily  Went to the emergency room recently for palpitation and shortness of breath  Loop monitor has shown sinus tachycardia versus atrial tachycardia  Patient reports symptoms are worse with large meal but better with small meals; also feels acid reflux at time  Discussed starting AAD vs. Treatment of GERD  Noted improvement in palpitations. Still notes them occurring daily, lasting 1 min to 20 min; no associated symptoms; heart rate around 120 bpm  Increased metoprolol to 50 mg daily   Had SVT on loop monitor in August 2024  Started on flecainide 100 mg twice daily   Since then brief symptomatic episode from PACs episodes noted on loop   Obesity  BMI of 31  Recommended diet and exercise  Asthma  On albuterol inhaler as needed and another inhaler as needed as well  Not using them  GERD  Does report having acid reflux  Discussed started treatment and he is agreeable  Started on omeprazole 20 mg daily  Patient is taking as needed and notes improvement  Lightheadedness  Notes having lightheadedness when in sitting position  Lasts for 1-2 min  Recommended he press symptom activator at the time and assess his BP      HPI:    Den Gonzalez is a 24 y.o. male with asthma,  obesity who present for follow-up visit for palpitations.    Patient was evaluated in the hospital in August with wide-complex tachycardia noted on the Apple Watch.  His work-up was negative including echocardiogram, stress test and cardiac MRI.  He underwent loop monitor implantation which showed possible atrial tachycardia versus SVT.  He was started on metoprolol XL 25 mg daily and has noted improvement but continues to have symptoms.    He reported having shortness of breath either shortly after palpitations or at the same time as palpitations.  He noted episodes lasted several hours.  It would with sitting or during driving.  He felt uncomfortable and had gone to the emergency room as recently as November 2, 2023.  Work-up at that time was negative.    He also reported having improvement in his symptoms with smaller meals.  He did have symptoms of heartburn but had not trialed any over-the-counter antiacid medications.    He also has inhalers for asthma but has not used them.    He noted his heart rate were in 130s at times.  It did not increase beyond 150 bpm.    He otherwise denied any swelling in lower extremity, orthopnea, PND or syncope.    Office visit 2/8/24:  Den presented for a follow-up visit.  He was started on metoprolol 25 mg daily after the last visit.  He noted his palpitations have improved though still occur on a daily basis.  Heart rate was usually in 120s.  He did not feel any symptoms associated with palpitations.  He would get dizziness at times even if he is sitting or standing but is was occurring at the time of palpitations.  He stated dizziness was not significant.  He had not checked his blood pressure at home.  He would occasionally does feel fatigue.  He was still avoiding caffeine and alcohol intake.  His acid reflux had improved.    Interval history:   Den presents for a follow-up. Patient reports doing well after starting flecainide 100 mg twice daily for SVT episode in  August. He does not have significant episodes of palpitations. He occasionally feels dizziness when in sitting position.     Patient Active Problem List   Diagnosis    Mild intermittent reactive airway disease without complication    Palpitations    Wide-complex tachycardia    Status post Medtronic loop recorder 8/3/2023     Past Medical History:   Diagnosis Date    Allergic     Asthma     Obesity      Social History     Socioeconomic History    Marital status: Single     Spouse name: Not on file    Number of children: Not on file    Years of education: Not on file    Highest education level: Not on file   Occupational History    Not on file   Tobacco Use    Smoking status: Never    Smokeless tobacco: Never   Vaping Use    Vaping status: Never Used   Substance and Sexual Activity    Alcohol use: Not Currently    Drug use: Never    Sexual activity: Not Currently     Partners: Female   Other Topics Concern    Not on file   Social History Narrative    Not on file     Social Drivers of Health     Financial Resource Strain: Not on file   Food Insecurity: No Food Insecurity (8/2/2023)    Hunger Vital Sign     Worried About Running Out of Food in the Last Year: Never true     Ran Out of Food in the Last Year: Never true   Transportation Needs: No Transportation Needs (8/2/2023)    PRAPARE - Transportation     Lack of Transportation (Medical): No     Lack of Transportation (Non-Medical): No   Physical Activity: Not on file   Stress: Not on file   Social Connections: Not on file   Intimate Partner Violence: Not on file   Housing Stability: Unknown (8/2/2023)    Housing Stability Vital Sign     Unable to Pay for Housing in the Last Year: No     Number of Places Lived in the Last Year: Not on file     Unstable Housing in the Last Year: Not on file      Family History   Problem Relation Age of Onset    Cardiomyopathy Father     Heart disease Father         Has idiopathic dialated cardiomyopathy and has a pacemaker.     Past  "Surgical History:   Procedure Laterality Date    CARDIAC ELECTROPHYSIOLOGY PROCEDURE N/A 8/3/2023    Procedure: Cardiac loop recorder implant;  Surgeon: Gavin Thomas MD;  Location: [facility];  Service: Cardiology       Current Outpatient Medications:     albuterol (ProAir HFA) 90 mcg/act inhaler, Inhale 2 puffs every 6 (six) hours as needed for wheezing, Disp: 18 g, Rfl: 3    Budeson-Glycopyrrol-Formoterol (Breztri Aerosphere) 160-9-4.8 MCG/ACT AERO, Inhale 2 puffs 2 (two) times a day as needed (as needed) Rinse mouth after use., Disp: 10.7 g, Rfl: 5    cetirizine (ZyrTEC) 10 mg tablet, Take 10 mg by mouth in the morning., Disp: , Rfl:     flecainide (TAMBOCOR) 100 mg tablet, TAKE 1 TABLET BY MOUTH TWICE A DAY, Disp: 180 tablet, Rfl: 1    metoprolol succinate (TOPROL-XL) 50 mg 24 hr tablet, TAKE 1 TABLET BY MOUTH EVERY DAY (Patient taking differently: Take 25 mg by mouth daily), Disp: 90 tablet, Rfl: 1    Multiple Vitamin (ONE-A-DAY MENS PO), Take by mouth, Disp: , Rfl:     omeprazole (PriLOSEC OTC) 20 MG tablet, Take 1 tablet (20 mg total) by mouth daily (Patient taking differently: Take 20 mg by mouth daily as needed), Disp: 30 tablet, Rfl: 3  Allergies   Allergen Reactions    Penicillins Rash       Imaging: Cardiac EP device report    Result Date: 11/16/2023  Narrative: CRISTO CADENA/ACTIVE SYSTEM IS MRI CONDITIONAL CARELINK TRANSMISSION: LOOP RECORDER. PRESENTING RHYTHM VS @ 90 BPM. BATTERY STATUS \"OK.\" NO PATIENT OR DEVICE ACTIVATED EPISODES. NORMAL DEVICE FUNCTION. DL     Cardiac EP device report    Result Date: 11/15/2023  Narrative: CRISTO CADENA/ACTIVE SYSTEM IS MRI CONDITIONAL CARELINK TRANSMISSION: ALERT--2 PATIENT ACTIVATED EPISODE. Symptom Details: Dizzy / Lightheaded, Short of breath, Heart pounding, Faint  Not at all active  Sitting in car while waiting for heart rate to go down; Symptom Details: Heart pounding, Other  A little active  Driving when heart rate suddenly spiked, EGM SHOWS NSR and " ST.[place]     Cardiac EP device report    Result Date: 11/7/2023  Narrative: CRISTO CADENA/ACTIVE SYSTEM IS MRI CONDITIONAL NON-BILLABLE. CARELINK TRANSMISSION: ALERT/SYMPTOM. PER PT: Short of breath, Heart pounding  Not at all active  Was sitting at my desk when shortness of breath came and then a fast heart rate. EGRAM SHOWS ST, ST PACs AND PAT. DL     X-ray chest 1 view portable    Result Date: 11/3/2023  Narrative: CHEST INDICATION:   chest pain. COMPARISON: 8/1/2023 EXAM PERFORMED/VIEWS:  XR CHEST PORTABLE Single view FINDINGS: Cardiac loop recorder has been placed Cardiomediastinal silhouette appears unremarkable. The lungs are clear.  No pneumothorax or pleural effusion. Osseous structures appear within normal limits for patient age.     Impression: No acute cardiopulmonary disease. Stable except for loop recorder placement Workstation performed: [id number]     Cardiac EP device report    Result Date: 11/3/2023  Narrative: CRISTO CADENA/ACTIVE SYSTEM IS MRI CONDITIONAL NON-BILLABLE. CARELINK TRANSMISSION: ALERT/SYMPTOM. PER PT: #15 Heart pounding, Other  A little active  Was driving when heart rate was suddenly very high. EGRAM SHOWS SR, ST , PACs AND PAT. # 16 Short of breath, Heart pounding, Other  Not at all active  Sitting in car waiting for heart rate to go down. Some shortness of breath now. EGRAM SHOWS SR,ST AND PACs. DL     Cardiac EP device report    Result Date: 10/27/2023  Narrative: CRISTO VENCES2/ACTIVE SYSTEM IS MRI CONDITIONAL NON-BILLABLE. CARELINK TRANSMISSION: ALERT/SYMPTOM. PER PT: Heart pounding, Heart fluttering  Not at all active  Was about to fall asleep when heart felt fluttery. EGRAM SHOWS SB, SR, ST, PACs. DL     Cardiac EP device report    Result Date: 10/20/2023  Narrative: CRISTO CADENA/ACTIVE SYSTEM IS MRI CONDITIONAL NON-BILLABLE. CARELINK TRANSMISSION: ALERT/SYMPTOM. PER PT: Short of breath, Heart pounding, Other  A little active  Sitting at my computer desk with a fast heart rate  "despite being seated for a while. EGRAM SHOWS SR, ST AND PACs. DL         Review of Systems:  Review of Systems   Constitutional:  Negative for chills, fatigue and fever.   HENT: Negative.     Eyes: Negative.  Negative for discharge.   Respiratory: Negative.  Negative for chest tightness, shortness of breath and wheezing.    Cardiovascular:  Negative for chest pain (See HPI), palpitations and leg swelling.   Gastrointestinal: Negative.  Negative for abdominal pain, nausea and vomiting.   Endocrine: Negative.    Genitourinary: Negative.    Musculoskeletal: Negative.  Negative for arthralgias.   Skin: Negative.  Negative for rash.   Allergic/Immunologic: Negative.    Neurological:  Positive for dizziness.   Hematological: Negative.    Psychiatric/Behavioral: Negative.       Objective:   /90 (BP Location: Left arm, Patient Position: Sitting, Cuff Size: Large)   Pulse 66   Ht 5' 9\" (1.753 m)   Wt 111 kg (244 lb)   BMI 36.03 kg/m²    Physical Exam:  GEN: NAD, Alert and oriented, well appearing  HEENT:Head, neck, ears, oral pharynx: Mucus membranes moist, oral pharynx clear, nares clear. External ears normal  EYES: Pupils equal, sclera anicteric  NECK: No JVD  CARDIOVASCULAR: RRR, No murmur, rub, gallops S1,S2  LUNGS: Clear To auscultation bilaterally  ABDOMEN: Soft, nondistended  EXTREMITIES/VASCULAR: No edema.  PSYCH: Normal Affect  NEURO: Grossly intact, moving all extremiteis equal, face symetric  HEME: No bleeding, bruising, petechia  SKIN: No significant rashes       Labs & Results:  Below is the patient's most recent value for Albumin, ALT, AST, BUN, Calcium, Chloride, Cholesterol, CO2, Creatinine, GFR, Glucose, HDL, Hematocrit, Hemoglobin, Hemoglobin A1C, LDL, Magnesium, Phosphorus, Platelets, Potassium, PSA, Sodium, Triglycerides, and WBC.   Lab Results   Component Value Date    ALT 50 08/01/2023    AST 17 08/01/2023    BUN 8 11/02/2023    CALCIUM 9.6 11/02/2023     11/02/2023    CO2 24 11/02/2023 "    CREATININE 0.63 11/02/2023    HCT 47.5 11/02/2023    HGB 16.3 11/02/2023    MG 1.8 (L) 11/02/2023     11/02/2023    K 3.3 (L) 11/02/2023    WBC 12.03 (H) 11/02/2023     Note: for a comprehensive list of the patient's lab results, access the Results Review activity.          Cardiac testing:     I personally reviewed the ECG performed in the clinic on 01/10/25. It reveals normal sinus rhythm.    Echocardiograms:  No results found for this or any previous visit.    No results found for this or any previous visit.      Catheterizations:   No results found for this or any previous visit.      Stress Tests:  No results found for this or any previous visit.      Holter monitor -   No results found for this or any previous visit.    No results found for this or any previous visit.

## 2025-02-20 ENCOUNTER — REMOTE DEVICE CLINIC VISIT (OUTPATIENT)
Dept: CARDIOLOGY CLINIC | Facility: CLINIC | Age: 25
End: 2025-02-20
Payer: COMMERCIAL

## 2025-02-20 DIAGNOSIS — R00.0 WIDE-COMPLEX TACHYCARDIA: ICD-10-CM

## 2025-02-20 DIAGNOSIS — R00.2 PALPITATIONS: Primary | ICD-10-CM

## 2025-02-20 PROCEDURE — 93298 REM INTERROG DEV EVAL SCRMS: CPT | Performed by: INTERNAL MEDICINE

## 2025-02-20 NOTE — PROGRESS NOTES
"MDT LNQ22/ACTIVE SYSTEM IS MRI CONDITIONAL   CARELINK TRANSMISSION: LOOP/BATTERY STATUS \"OK.\" PRESENTING RHYTHM SHOWS NSR @ AVG 72 BPM. NO PATIENT OR DEVICE ACTIVATED EPISODES. PVC BURDEN 0.4%. NORMAL DEVICE FUNCTION.  ES   "

## 2025-02-21 ENCOUNTER — RESULTS FOLLOW-UP (OUTPATIENT)
Dept: CARDIOLOGY CLINIC | Facility: CLINIC | Age: 25
End: 2025-02-21

## 2025-02-26 DIAGNOSIS — R00.0 TACHYCARDIA: ICD-10-CM

## 2025-02-26 RX ORDER — FLECAINIDE ACETATE 100 MG/1
100 TABLET ORAL 2 TIMES DAILY
Qty: 180 TABLET | Refills: 3 | Status: SHIPPED | OUTPATIENT
Start: 2025-02-26

## 2025-04-03 ENCOUNTER — RESULTS FOLLOW-UP (OUTPATIENT)
Dept: CARDIOLOGY CLINIC | Facility: CLINIC | Age: 25
End: 2025-04-03

## 2025-05-07 ENCOUNTER — REMOTE DEVICE CLINIC VISIT (OUTPATIENT)
Dept: CARDIOLOGY CLINIC | Facility: CLINIC | Age: 25
End: 2025-05-07
Payer: COMMERCIAL

## 2025-05-07 ENCOUNTER — RESULTS FOLLOW-UP (OUTPATIENT)
Dept: CARDIOLOGY CLINIC | Facility: CLINIC | Age: 25
End: 2025-05-07

## 2025-05-07 DIAGNOSIS — R00.2 PALPITATIONS: Primary | ICD-10-CM

## 2025-05-07 PROCEDURE — 93298 REM INTERROG DEV EVAL SCRMS: CPT | Performed by: INTERNAL MEDICINE

## 2025-05-07 NOTE — PROGRESS NOTES
"MDT LNQ22/ACTIVE SYSTEM IS MRI CONDITIONAL   CARELINK TRANSMISSION: LOOP RECORDER. PRESENTING RHYTHM NSR @ 74 BPM. BATTERY STATUS \"OK\". 1 PT ACTIVATED EPISODE PREVIOUSLY ADDRESSED IN ALERT. NO NEW PATIENT OR DEVICE ACTIVATED EPISODES. NORMAL DEVICE FUNCTION. DL   "

## 2025-06-18 ENCOUNTER — OFFICE VISIT (OUTPATIENT)
Dept: FAMILY MEDICINE CLINIC | Facility: CLINIC | Age: 25
End: 2025-06-18
Payer: COMMERCIAL

## 2025-06-18 VITALS
RESPIRATION RATE: 16 BRPM | SYSTOLIC BLOOD PRESSURE: 134 MMHG | DIASTOLIC BLOOD PRESSURE: 94 MMHG | OXYGEN SATURATION: 99 % | HEART RATE: 79 BPM | WEIGHT: 248.4 LBS | BODY MASS INDEX: 36.79 KG/M2 | HEIGHT: 69 IN | TEMPERATURE: 97.3 F

## 2025-06-18 DIAGNOSIS — Z00.00 ANNUAL PHYSICAL EXAM: Primary | ICD-10-CM

## 2025-06-18 DIAGNOSIS — R00.0 WIDE-COMPLEX TACHYCARDIA: ICD-10-CM

## 2025-06-18 DIAGNOSIS — J45.20 MILD INTERMITTENT REACTIVE AIRWAY DISEASE WITHOUT COMPLICATION: ICD-10-CM

## 2025-06-18 DIAGNOSIS — I47.29 OTHER VENTRICULAR TACHYCARDIA (HCC): ICD-10-CM

## 2025-06-18 PROCEDURE — 99395 PREV VISIT EST AGE 18-39: CPT | Performed by: FAMILY MEDICINE

## 2025-06-18 NOTE — PATIENT INSTRUCTIONS
"Patient Education     Routine physical for adults   The Basics   Written by the doctors and editors at [facility]   What is a physical? -- A physical is a routine visit, or \"check-up,\" with your doctor. You might also hear it called a \"wellness visit\" or \"preventive visit.\"  During each visit, the doctor will:   Ask about your physical and mental health   Ask about your habits, behaviors, and lifestyle   Do an exam   Give you vaccines if needed   Talk to you about any medicines you take   Give advice about your health   Answer your questions  Getting regular check-ups is an important part of taking care of your health. It can help your doctor find and treat any problems you have. But it's also important for preventing health problems.  A routine physical is different from a \"sick visit.\" A sick visit is when you see a doctor because of a health concern or problem. Since physicals are scheduled ahead of time, you can think about what you want to ask the doctor.  How often should I get a physical? -- It depends on your age and health. In general, for people age 21 years and older:   If you are younger than 50 years, you might be able to get a physical every 3 years.   If you are 50 years or older, your doctor might recommend a physical every year.  If you have an ongoing health condition, like diabetes or high blood pressure, your doctor will probably want to see you more often.  What happens during a physical? -- In general, each visit will include:   Physical exam - The doctor or nurse will check your height, weight, heart rate, and blood pressure. They will also look at your eyes and ears. They will ask about how you are feeling and whether you have any symptoms that bother you.   Medicines - It's a good idea to bring a list of all the medicines you take to each doctor visit. Your doctor will talk to you about your medicines and answer any questions. Tell them if you are having any side effects that bother you. You " "should also tell them if you are having trouble paying for any of your medicines.   Habits and behaviors - This includes:   Your diet   Your exercise habits   Whether you smoke, drink alcohol, or use drugs   Whether you are sexually active   Whether you feel safe at home  Your doctor will talk to you about things you can do to improve your health and lower your risk of health problems. They will also offer help and support. For example, if you want to quit smoking, they can give you advice and might prescribe medicines. If you want to improve your diet or get more physical activity, they can help you with this, too.   Lab tests, if needed - The tests you get will depend on your age and situation. For example, your doctor might want to check your:   Cholesterol   Blood sugar   Iron level   Vaccines - The recommended vaccines will depend on your age, health, and what vaccines you already had. Vaccines are very important because they can prevent certain serious or deadly infections.   Discussion of screening - \"Screening\" means checking for diseases or other health problems before they cause symptoms. Your doctor can recommend screening based on your age, risk, and preferences. This might include tests to check for:   Cancer, such as breast, prostate, cervical, ovarian, colorectal, prostate, lung, or skin cancer   Sexually transmitted infections, such as chlamydia and gonorrhea   Mental health conditions like depression and anxiety  Your doctor will talk to you about the different types of screening tests. They can help you decide which screenings to have. They can also explain what the results might mean.   Answering questions - The physical is a good time to ask the doctor or nurse questions about your health. If needed, they can refer you to other doctors or specialists, too.  Adults older than 65 years often need other care, too. As you get older, your doctor will talk to you about:   How to prevent falling at " home   Hearing or vision tests   Memory testing   How to take your medicines safely   Making sure that you have the help and support you need at home  All topics are updated as new evidence becomes available and our peer review process is complete.  This topic retrieved from [employer] on: May 02, 2024.  Topic 853414 Version 1.0  Release: 32.4.3 - C32.122  © 2024 UpToDate, Inc. and/or its affiliates. All rights reserved.  Consumer Information Use and Disclaimer   Disclaimer: This generalized information is a limited summary of diagnosis, treatment, and/or medication information. It is not meant to be comprehensive and should be used as a tool to help the user understand and/or assess potential diagnostic and treatment options. It does NOT include all information about conditions, treatments, medications, side effects, or risks that may apply to a specific patient. It is not intended to be medical advice or a substitute for the medical advice, diagnosis, or treatment of a health care provider based on the health care provider's examination and assessment of a patient's specific and unique circumstances. Patients must speak with a health care provider for complete information about their health, medical questions, and treatment options, including any risks or benefits regarding use of medications. This information does not endorse any treatments or medications as safe, effective, or approved for treating a specific patient. UpToDate, Inc. and its affiliates disclaim any warranty or liability relating to this information or the use thereof.The use of this information is governed by the Terms of Use, available at https://www.[employer].com/en/know/clinical-effectiveness-terms. 2024© UpToDate, Inc. and its affiliates and/or licensors. All rights reserved.  Copyright   © 2024 UpToDate, Inc. and/or its affiliates. All rights reserved.

## 2025-06-18 NOTE — PROGRESS NOTES
Adult Annual Physical  Name: Den Gonzalez      : 2000      MRN: [id number]  Encounter Provider: Andrei Finch DO  Encounter Date: 2025   Encounter department: [facility]    :  Assessment & Plan  Annual physical exam         Mild intermittent reactive airway disease without complication         Other ventricular tachycardia (HCC)         Wide-complex tachycardia    Orders:    Lipid panel; Future    Basic metabolic panel; Future    Hepatic function panel; Future    CBC and Platelet; Future      Chief Complaint   Patient presents with    Annual Exam    Mild intermittent reactive airway disease without complicati    Palpitations     Follow up, referred to cardiologist and was placed on metoprolol and tambocor        Preventive Screenings:    - Prostate cancer screening: screening not indicated     Immunizations:  - Immunizations due: Prevnar 20 and Tdap         History of Present Illness     Adult Annual Physical:  Patient presents for annual physical. Annual.  Palpations controlled.  Takes an occasional Omeprazole..     Diet and Physical Activity:  - Diet/Nutrition: no special diet.  - Exercise: no formal exercise.    Depression Screening:  - PHQ-2 Score: 0    General Health:  - Sleep: 4-6 hours of sleep on average.  - Hearing: normal hearing bilateral ears.  - Vision: no vision problems, wears glasses and wears contacts.  - Dental: regular dental visits, brushes teeth twice daily and floss regularly.     Health:  - History of STDs: no.   - Urinary symptoms: none.     Advanced Care Planning:  - Has an advanced directive?: no    - Has a durable medical POA?: no    - ACP document given to patient?: yes      Review of Systems   Constitutional: Negative.    HENT: Negative.     Eyes: Negative.    Respiratory: Negative.     Cardiovascular: Negative.    Gastrointestinal: Negative.    Genitourinary: Negative.    Musculoskeletal: Negative.    Skin: Negative.    Neurological: Negative.   "  Psychiatric/Behavioral: Negative.           Objective   /94 (BP Location: Left arm, Patient Position: Sitting, Cuff Size: Standard)   Pulse 79   Temp (!) 97.3 °F (36.3 °C) (Temporal)   Resp 16   Ht 5' 9\" (1.753 m)   Wt 113 kg (248 lb 6.4 oz)   SpO2 99%   BMI 36.68 kg/m²     Physical Exam  Constitutional:       Appearance: He is well-developed.   HENT:      Head: Normocephalic and atraumatic.      Right Ear: External ear normal.      Left Ear: External ear normal.      Nose: Nose normal.     Eyes:      Conjunctiva/sclera: Conjunctivae normal.      Pupils: Pupils are equal, round, and reactive to light.       Cardiovascular:      Rate and Rhythm: Normal rate and regular rhythm.      Pulses: Normal pulses.      Heart sounds: Normal heart sounds.   Pulmonary:      Effort: Pulmonary effort is normal.      Breath sounds: Normal breath sounds.   Abdominal:      General: Bowel sounds are normal.      Palpations: Abdomen is soft.     Musculoskeletal:      Cervical back: Normal range of motion and neck supple.     Skin:     General: Skin is warm and dry.     Neurological:      Mental Status: He is alert and oriented to person, place, and time.      Deep Tendon Reflexes: Reflexes are normal and symmetric.     Psychiatric:         Behavior: Behavior normal.         Thought Content: Thought content normal.         Judgment: Judgment normal.         "

## 2025-06-18 NOTE — ASSESSMENT & PLAN NOTE
Orders:    Lipid panel; Future    Basic metabolic panel; Future    Hepatic function panel; Future    CBC and Platelet; Future

## 2025-07-18 DIAGNOSIS — R00.0 WIDE-COMPLEX TACHYCARDIA: ICD-10-CM

## 2025-07-18 RX ORDER — METOPROLOL SUCCINATE 50 MG/1
50 TABLET, EXTENDED RELEASE ORAL DAILY
Qty: 90 TABLET | Refills: 1 | Status: SHIPPED | OUTPATIENT
Start: 2025-07-18

## 2025-08-06 ENCOUNTER — REMOTE DEVICE CLINIC VISIT (OUTPATIENT)
Dept: CARDIOLOGY CLINIC | Facility: CLINIC | Age: 25
End: 2025-08-06
Payer: COMMERCIAL

## 2025-08-06 DIAGNOSIS — R00.2 PALPITATIONS: Primary | ICD-10-CM

## 2025-08-06 PROCEDURE — 93298 REM INTERROG DEV EVAL SCRMS: CPT | Performed by: INTERNAL MEDICINE
